# Patient Record
Sex: FEMALE | Race: WHITE | NOT HISPANIC OR LATINO | URBAN - METROPOLITAN AREA
[De-identification: names, ages, dates, MRNs, and addresses within clinical notes are randomized per-mention and may not be internally consistent; named-entity substitution may affect disease eponyms.]

---

## 2023-02-11 PROBLEM — I10 BENIGN ESSENTIAL HTN: Status: ACTIVE | Noted: 2023-02-11

## 2023-02-11 PROBLEM — I83.93 VARICOSE VEINS OF LEGS: Status: ACTIVE | Noted: 2023-02-11

## 2023-02-11 PROBLEM — M79.606 LEG PAIN: Status: ACTIVE | Noted: 2023-02-11

## 2023-02-11 PROBLEM — E66.01 MORBID OBESITY (MULTI): Status: ACTIVE | Noted: 2023-02-11

## 2023-02-11 PROBLEM — I83.899 VARICOSE VEINS WITH SWELLING: Status: ACTIVE | Noted: 2023-02-11

## 2023-02-11 PROBLEM — R03.0 ELEVATED BLOOD PRESSURE READING: Status: ACTIVE | Noted: 2023-02-11

## 2023-02-11 PROBLEM — E03.9 HYPOTHYROID: Status: ACTIVE | Noted: 2023-02-11

## 2023-02-11 RX ORDER — HYDROCHLOROTHIAZIDE 12.5 MG/1
1 CAPSULE ORAL DAILY
COMMUNITY
Start: 2021-08-17 | End: 2023-03-10 | Stop reason: SDUPTHER

## 2023-02-11 RX ORDER — AMLODIPINE BESYLATE 10 MG/1
1 TABLET ORAL DAILY
COMMUNITY
Start: 2021-11-11 | End: 2023-03-10 | Stop reason: SDUPTHER

## 2023-03-10 ENCOUNTER — OFFICE VISIT (OUTPATIENT)
Dept: PRIMARY CARE | Facility: CLINIC | Age: 43
End: 2023-03-10
Payer: COMMERCIAL

## 2023-03-10 VITALS
DIASTOLIC BLOOD PRESSURE: 82 MMHG | WEIGHT: 285 LBS | SYSTOLIC BLOOD PRESSURE: 122 MMHG | HEART RATE: 77 BPM | HEIGHT: 64 IN | BODY MASS INDEX: 48.65 KG/M2 | OXYGEN SATURATION: 98 %

## 2023-03-10 DIAGNOSIS — L98.8 FACIAL RHYTIDS: ICD-10-CM

## 2023-03-10 DIAGNOSIS — E03.9 HYPOTHYROIDISM, UNSPECIFIED TYPE: ICD-10-CM

## 2023-03-10 DIAGNOSIS — R23.4: ICD-10-CM

## 2023-03-10 DIAGNOSIS — I10 HYPERTENSION, UNSPECIFIED TYPE: Primary | ICD-10-CM

## 2023-03-10 DIAGNOSIS — Z00.00 HEALTHCARE MAINTENANCE: ICD-10-CM

## 2023-03-10 DIAGNOSIS — I10 BENIGN ESSENTIAL HTN: ICD-10-CM

## 2023-03-10 PROCEDURE — 3074F SYST BP LT 130 MM HG: CPT | Performed by: INTERNAL MEDICINE

## 2023-03-10 PROCEDURE — 1036F TOBACCO NON-USER: CPT | Performed by: INTERNAL MEDICINE

## 2023-03-10 PROCEDURE — 99214 OFFICE O/P EST MOD 30 MIN: CPT | Performed by: INTERNAL MEDICINE

## 2023-03-10 PROCEDURE — 3079F DIAST BP 80-89 MM HG: CPT | Performed by: INTERNAL MEDICINE

## 2023-03-10 RX ORDER — HYDROCHLOROTHIAZIDE 12.5 MG/1
12.5 CAPSULE ORAL EVERY MORNING
Qty: 90 CAPSULE | Refills: 1 | Status: SHIPPED | OUTPATIENT
Start: 2023-03-10 | End: 2023-08-10 | Stop reason: SDUPTHER

## 2023-03-10 RX ORDER — AMLODIPINE BESYLATE 10 MG/1
10 TABLET ORAL DAILY
Qty: 90 TABLET | Refills: 1 | Status: SHIPPED | OUTPATIENT
Start: 2023-03-10 | End: 2023-08-10 | Stop reason: SDUPTHER

## 2023-03-10 NOTE — PROGRESS NOTES
"Subjective   Patient ID: Ori Dumont is a 42 y.o. female who presents for Follow-up and Med Refill.  HPI        Patient overall feels well today blood pressures been well controlled she is exercising she mention she has noticed some recurrence of some frontal wrinkles on her forehead is interested in Botox retreatment as well as some she feels prominence of her nasolabial coming back bilateral        Health Maintenance:      Colonoscopy:      Mammogram:      Pelvic/Pap:      Low dose chest CT:      Aorta duplex:      Optho:      Podiatry:        Vaccines:      Prevnar 20:      Prevnar 13:      Pneumovax 23:      Tdap:      Shingrix:      COVID:      Influenza:        ROS:      General: denies fever/chills/weight loss      Head: denies HA/trauma/masses/dizziness      Eyes: denies vision change/loss of vision/blurry vision/diplopia/eye pain      Ears: denies hearing loss/tinnitus/otalgia/otorrhea      Nose: denies nasal drainage/anosmia      Throat: denies dysphagia/odynophagia      Lymphatics: denies lymph node swelling      Cardiac: denies CP/palpitations/orthopnea/PND      Pulmonary: denies dyspnea/cough/wheezing      GI: denies abd pain/n/v/diarrhea/melena/hematochezia/hematemesis      : denies dysuria/hematuria/change frequency      Genital: denies genital discharge/lesions      Skin: Some recurrence of frontalis  Wrinkles as well as prominence of nasolabial denies rashes/lesions/masses      MSK: denies weakness/swelling/edema/gait imbalance/pain      Neuro: denies paresthesias/seizures/dysarthria      Psych: denies depression/anxiety/suicidal or homicidal ideations            Objective   /82   Pulse 77   Ht 1.626 m (5' 4\")   Wt 129 kg (285 lb)   SpO2 98%   BMI 48.92 kg/m²      Physical Exam:     General: AO3, NAD     Head: atraumatic/NC     Eyes: EOMI/PERRLA. Negative APD     Ears: TM pearly gray, EAC clear. No lesions or erythema     Nose: symmetric nares, no discharge     Throat: trachea midline, " uvula midline pink mucosa. No thyromegaly     Lymphatics: no cervical/supraclavicular/ant or posterior cervical adenopathy/axillary/inguinal adenopathy     Breast: not examined     Chest: no deformity or tenderness to palpation     Pulm: CTA b/l, no wheeze/rhonchi/rales. nonlabored     Cardiac: RRR +s1s2, no m/r/g.      GI: soft, NT/ND. Normoactive Bsx4. No rebound/guarding.     Rectal: no examined     MSK: 5/5 strength UE LE. No edema/clubbing/cyanosis     Skin: Very mild to moderate left central frontalis righted and inferior nasolabial prominence mild at this time no rashes/lesions     Vascular: 2+ palp DP PT radials b/l. Negative carotid bruit     Neuro: CNII-XII intact. No focal deficits. Reflexes 2/4 brachioradialis bicep tricep patellar achilles. Finger to nose intact.     Psych: appropriate mood/affect                    No results found for: BMPR1A, CBCDIF      Assessment/Plan   Diagnoses and all orders for this visit:  Hypertension, unspecified type  -     hydroCHLOROthiazide (Microzide) 12.5 mg capsule; Take 1 capsule (12.5 mg) by mouth once daily in the morning.  -     amLODIPine (Norvasc) 10 mg tablet; Take 1 tablet (10 mg) by mouth once daily. For blood pressure  Healthcare maintenance  -     CBC and Auto Differential; Future  -     Basic Metabolic Panel; Future  -     Lipid panel; Future  -     T4, free; Future  -     TSH with reflex to Free T4 if abnormal; Future  -     Cortisol; Future  -     Hemoglobin A1C; Future  Hypothyroidism, unspecified type  Benign essential HTN  Facial rhytids  Comments:  As we discussed recommend Botox 40 units total at your convenience you can schedule for this  Prominent nasolabial folds  Comments:  Recommend 1 additional syringe of Juvéderm ultra XC in about 6 months for retreatment    Screening lab work due August 2023 CBC BMP T4 TSH lipid cortiso A1c    Thank you for making appointment today Ori    Please follow-up at your convenience for the Botox treatment  Please  follow-up for standard medical treatment in 6 months       Fredy Dumont, DO

## 2023-08-09 ENCOUNTER — LAB (OUTPATIENT)
Dept: LAB | Facility: LAB | Age: 43
End: 2023-08-09
Payer: COMMERCIAL

## 2023-08-09 DIAGNOSIS — Z00.00 HEALTHCARE MAINTENANCE: ICD-10-CM

## 2023-08-09 LAB
ANION GAP IN SER/PLAS: 11 MMOL/L (ref 10–20)
BASOPHILS (10*3/UL) IN BLOOD BY AUTOMATED COUNT: 0.05 X10E9/L (ref 0–0.1)
BASOPHILS/100 LEUKOCYTES IN BLOOD BY AUTOMATED COUNT: 0.8 % (ref 0–2)
CALCIUM (MG/DL) IN SER/PLAS: 9.3 MG/DL (ref 8.6–10.6)
CARBON DIOXIDE, TOTAL (MMOL/L) IN SER/PLAS: 28 MMOL/L (ref 21–32)
CHLORIDE (MMOL/L) IN SER/PLAS: 105 MMOL/L (ref 98–107)
CHOLESTEROL (MG/DL) IN SER/PLAS: 198 MG/DL (ref 0–199)
CHOLESTEROL IN HDL (MG/DL) IN SER/PLAS: 63.4 MG/DL
CHOLESTEROL/HDL RATIO: 3.1
CORTISOL (UG/DL) IN SERUM: 17.5 UG/DL (ref 2.5–20)
CREATININE (MG/DL) IN SER/PLAS: 0.69 MG/DL (ref 0.5–1.05)
EOSINOPHILS (10*3/UL) IN BLOOD BY AUTOMATED COUNT: 0.19 X10E9/L (ref 0–0.7)
EOSINOPHILS/100 LEUKOCYTES IN BLOOD BY AUTOMATED COUNT: 3.1 % (ref 0–6)
ERYTHROCYTE DISTRIBUTION WIDTH (RATIO) BY AUTOMATED COUNT: 14.4 % (ref 11.5–14.5)
ERYTHROCYTE MEAN CORPUSCULAR HEMOGLOBIN CONCENTRATION (G/DL) BY AUTOMATED: 30.3 G/DL (ref 32–36)
ERYTHROCYTE MEAN CORPUSCULAR VOLUME (FL) BY AUTOMATED COUNT: 84 FL (ref 80–100)
ERYTHROCYTES (10*6/UL) IN BLOOD BY AUTOMATED COUNT: 4.57 X10E12/L (ref 4–5.2)
ESTIMATED AVERAGE GLUCOSE FOR HBA1C: 105 MG/DL
GFR FEMALE: >90 ML/MIN/1.73M2
GLUCOSE (MG/DL) IN SER/PLAS: 92 MG/DL (ref 74–99)
HEMATOCRIT (%) IN BLOOD BY AUTOMATED COUNT: 38.3 % (ref 36–46)
HEMOGLOBIN (G/DL) IN BLOOD: 11.6 G/DL (ref 12–16)
HEMOGLOBIN A1C/HEMOGLOBIN TOTAL IN BLOOD: 5.3 %
IMMATURE GRANULOCYTES/100 LEUKOCYTES IN BLOOD BY AUTOMATED COUNT: 0.2 % (ref 0–0.9)
LDL: 119 MG/DL (ref 0–99)
LEUKOCYTES (10*3/UL) IN BLOOD BY AUTOMATED COUNT: 6.1 X10E9/L (ref 4.4–11.3)
LYMPHOCYTES (10*3/UL) IN BLOOD BY AUTOMATED COUNT: 1.96 X10E9/L (ref 1.2–4.8)
LYMPHOCYTES/100 LEUKOCYTES IN BLOOD BY AUTOMATED COUNT: 32 % (ref 13–44)
MONOCYTES (10*3/UL) IN BLOOD BY AUTOMATED COUNT: 0.53 X10E9/L (ref 0.1–1)
MONOCYTES/100 LEUKOCYTES IN BLOOD BY AUTOMATED COUNT: 8.7 % (ref 2–10)
NEUTROPHILS (10*3/UL) IN BLOOD BY AUTOMATED COUNT: 3.38 X10E9/L (ref 1.2–7.7)
NEUTROPHILS/100 LEUKOCYTES IN BLOOD BY AUTOMATED COUNT: 55.2 % (ref 40–80)
NRBC (PER 100 WBCS) BY AUTOMATED COUNT: 0 /100 WBC (ref 0–0)
PLATELETS (10*3/UL) IN BLOOD AUTOMATED COUNT: 473 X10E9/L (ref 150–450)
POTASSIUM (MMOL/L) IN SER/PLAS: 3.9 MMOL/L (ref 3.5–5.3)
SODIUM (MMOL/L) IN SER/PLAS: 140 MMOL/L (ref 136–145)
THYROTROPIN (MIU/L) IN SER/PLAS BY DETECTION LIMIT <= 0.05 MIU/L: 2.72 MIU/L (ref 0.44–3.98)
THYROXINE (T4) FREE (NG/DL) IN SER/PLAS: 0.97 NG/DL (ref 0.78–1.48)
TRIGLYCERIDE (MG/DL) IN SER/PLAS: 77 MG/DL (ref 0–149)
UREA NITROGEN (MG/DL) IN SER/PLAS: 14 MG/DL (ref 6–23)
VLDL: 15 MG/DL (ref 0–40)

## 2023-08-09 PROCEDURE — 36415 COLL VENOUS BLD VENIPUNCTURE: CPT

## 2023-08-09 PROCEDURE — 85025 COMPLETE CBC W/AUTO DIFF WBC: CPT

## 2023-08-09 PROCEDURE — 80061 LIPID PANEL: CPT

## 2023-08-09 PROCEDURE — 80048 BASIC METABOLIC PNL TOTAL CA: CPT

## 2023-08-09 PROCEDURE — 84443 ASSAY THYROID STIM HORMONE: CPT

## 2023-08-09 PROCEDURE — 83036 HEMOGLOBIN GLYCOSYLATED A1C: CPT

## 2023-08-09 PROCEDURE — 82533 TOTAL CORTISOL: CPT

## 2023-08-09 PROCEDURE — 84439 ASSAY OF FREE THYROXINE: CPT

## 2023-08-10 ENCOUNTER — OFFICE VISIT (OUTPATIENT)
Dept: PRIMARY CARE | Facility: CLINIC | Age: 43
End: 2023-08-10
Payer: COMMERCIAL

## 2023-08-10 VITALS
SYSTOLIC BLOOD PRESSURE: 130 MMHG | DIASTOLIC BLOOD PRESSURE: 88 MMHG | HEIGHT: 64 IN | BODY MASS INDEX: 48.65 KG/M2 | HEART RATE: 73 BPM | WEIGHT: 285 LBS | OXYGEN SATURATION: 95 %

## 2023-08-10 DIAGNOSIS — D75.839 THROMBOCYTOSIS: ICD-10-CM

## 2023-08-10 DIAGNOSIS — D64.9 ANEMIA, UNSPECIFIED TYPE: Primary | ICD-10-CM

## 2023-08-10 DIAGNOSIS — I10 HYPERTENSION, UNSPECIFIED TYPE: ICD-10-CM

## 2023-08-10 PROCEDURE — 3075F SYST BP GE 130 - 139MM HG: CPT | Performed by: INTERNAL MEDICINE

## 2023-08-10 PROCEDURE — 99214 OFFICE O/P EST MOD 30 MIN: CPT | Performed by: INTERNAL MEDICINE

## 2023-08-10 PROCEDURE — 1036F TOBACCO NON-USER: CPT | Performed by: INTERNAL MEDICINE

## 2023-08-10 PROCEDURE — 3079F DIAST BP 80-89 MM HG: CPT | Performed by: INTERNAL MEDICINE

## 2023-08-10 RX ORDER — AMLODIPINE BESYLATE 10 MG/1
10 TABLET ORAL DAILY
Qty: 90 TABLET | Refills: 1 | Status: SHIPPED | OUTPATIENT
Start: 2023-08-10 | End: 2024-03-07 | Stop reason: SDUPTHER

## 2023-08-10 RX ORDER — HYDROCHLOROTHIAZIDE 12.5 MG/1
12.5 CAPSULE ORAL EVERY MORNING
Qty: 90 CAPSULE | Refills: 1 | Status: SHIPPED | OUTPATIENT
Start: 2023-08-10 | End: 2024-03-07 | Stop reason: SDUPTHER

## 2023-08-10 NOTE — PROGRESS NOTES
"Subjective   Patient ID: Ori Dumont is a 43 y.o. female who presents for Follow-up and Results (Lab results).  HPI        Patient presents for follow-up occasionally heartburn acid reflux in the back of the throat over the last several weeks intermittently none at present grade 0 out of 10 seems worse when she has citrus drinks or tomatoes better with Tums or avoidance seems worse with some stress with the job search  Occasionally her stomach feels upset when it happens none at present  Denies nausea vomiting fever chills hematochezia melena hematemesis        Health Maintenance:      Colonoscopy:      Mammogram:      Pelvic/Pap:      Low dose chest CT:      Aorta duplex:      Optho:      Podiatry:        Vaccines:      Prevnar 20:      Prevnar 13:      Pneumovax 23:      Tdap:      Shingrix:      COVID:      Influenza:        ROS:      General: denies fever/chills/weight loss      Head: denies HA/trauma/masses/dizziness      Eyes: denies vision change/loss of vision/blurry vision/diplopia/eye pain      Ears: denies hearing loss/tinnitus/otalgia/otorrhea      Nose: denies nasal drainage/anosmia      Throat: denies dysphagia/odynophagia      Lymphatics: denies lymph node swelling      Cardiac: denies CP/palpitations/orthopnea/PND      Pulmonary: denies dyspnea/cough/wheezing      GI: Intermittent acidic reflux in the back of the throat denies abd pain/n/v/diarrhea/melena/hematochezia/hematemesis      : denies dysuria/hematuria/change frequency      Genital: denies genital discharge/lesions      Skin: denies rashes/lesions/masses      MSK: denies weakness/swelling/edema/gait imbalance/pain      Neuro: denies paresthesias/seizures/dysarthria      Psych: Occasionally feels bored not enjoying her workouts some stress with the job search denies depression/anxiety/suicidal or homicidal ideations            Objective   /88   Pulse 73   Ht 1.626 m (5' 4\")   Wt 129 kg (285 lb)   SpO2 95%   BMI 48.92 kg/m²     " " Physical Exam:     General: AO3, NAD     Head: atraumatic/NC     Eyes: EOMI/PERRLA. Negative APD     Ears: TM pearly gray, EAC clear. No lesions or erythema     Nose: symmetric nares, no discharge     Throat: trachea midline, uvula midline pink mucosa. No thyromegaly     Lymphatics: no cervical/supraclavicular/ant or posterior cervical adenopathy/axillary/inguinal adenopathy     Breast: not examined     Chest: no deformity or tenderness to palpation     Pulm: CTA b/l, no wheeze/rhonchi/rales. nonlabored     Cardiac: RRR +s1s2, no m/r/g.      GI: soft, NT/ND. Normoactive Bsx4. No rebound/guarding.     Rectal: no examined     MSK: 5/5 strength UE LE. No edema/clubbing/cyanosis     Skin: no rashes/lesions     Vascular: 2+ palp DP PT radials b/l. Negative carotid bruit     Neuro: CNII-XII intact. No focal deficits. Reflexes 2/4 brachioradialis bicep tricep patellar achilles. Finger to nose intact.     Psych: appropriate mood/affect                    No results found for: \"BMPR1A\", \"CBCDIF\"      Assessment/Plan   Diagnoses and all orders for this visit:  Anemia, unspecified type  -     Iron and TIBC; Future  -     Ferritin; Future  Hypertension, unspecified type  -     hydroCHLOROthiazide (Microzide) 12.5 mg capsule; Take 1 capsule (12.5 mg) by mouth once daily in the morning.  -     amLODIPine (Norvasc) 10 mg tablet; Take 1 tablet (10 mg) by mouth once daily. For blood pressure  Thrombocytosis  -     CBC and Auto Differential; Future       Avoid citrus type foods alcohol milk chocolate tomatoes monitor if does not get better will then need trial of PPI and may be GI referral    Try to  a new hobby like a new workout get back into martial arts    Screening blood work due July 2024    Thank you for making appointment today Ori    Please follow-up 3 months    Fredy Dumont,   "

## 2024-03-07 ENCOUNTER — OFFICE VISIT (OUTPATIENT)
Dept: PRIMARY CARE | Facility: CLINIC | Age: 44
End: 2024-03-07
Payer: COMMERCIAL

## 2024-03-07 VITALS
OXYGEN SATURATION: 98 % | HEART RATE: 74 BPM | SYSTOLIC BLOOD PRESSURE: 140 MMHG | BODY MASS INDEX: 48.65 KG/M2 | DIASTOLIC BLOOD PRESSURE: 90 MMHG | WEIGHT: 285 LBS | HEIGHT: 64 IN

## 2024-03-07 DIAGNOSIS — I10 HYPERTENSION, UNSPECIFIED TYPE: ICD-10-CM

## 2024-03-07 DIAGNOSIS — Z12.31 VISIT FOR SCREENING MAMMOGRAM: Primary | ICD-10-CM

## 2024-03-07 DIAGNOSIS — L98.8 FACIAL RHYTIDS: ICD-10-CM

## 2024-03-07 DIAGNOSIS — Z23 NEED FOR INFLUENZA VACCINATION: ICD-10-CM

## 2024-03-07 PROBLEM — L30.9 DERMATITIS, UNSPECIFIED: Status: RESOLVED | Noted: 2020-02-06 | Resolved: 2024-03-07

## 2024-03-07 PROBLEM — D49.2 NEOPLASM OF UNSPECIFIED BEHAVIOR OF BONE, SOFT TISSUE, AND SKIN: Status: RESOLVED | Noted: 2020-02-06 | Resolved: 2024-03-07

## 2024-03-07 PROCEDURE — 90471 IMMUNIZATION ADMIN: CPT | Performed by: INTERNAL MEDICINE

## 2024-03-07 PROCEDURE — 3080F DIAST BP >= 90 MM HG: CPT | Performed by: INTERNAL MEDICINE

## 2024-03-07 PROCEDURE — 90686 IIV4 VACC NO PRSV 0.5 ML IM: CPT | Performed by: INTERNAL MEDICINE

## 2024-03-07 PROCEDURE — 3077F SYST BP >= 140 MM HG: CPT | Performed by: INTERNAL MEDICINE

## 2024-03-07 PROCEDURE — 99213 OFFICE O/P EST LOW 20 MIN: CPT | Performed by: INTERNAL MEDICINE

## 2024-03-07 PROCEDURE — 1036F TOBACCO NON-USER: CPT | Performed by: INTERNAL MEDICINE

## 2024-03-07 RX ORDER — HYDROCHLOROTHIAZIDE 12.5 MG/1
12.5 CAPSULE ORAL EVERY MORNING
Qty: 90 CAPSULE | Refills: 1 | Status: SHIPPED | OUTPATIENT
Start: 2024-03-07

## 2024-03-07 RX ORDER — AMLODIPINE BESYLATE 10 MG/1
10 TABLET ORAL DAILY
Qty: 90 TABLET | Refills: 1 | Status: SHIPPED | OUTPATIENT
Start: 2024-03-07

## 2024-03-07 ASSESSMENT — PATIENT HEALTH QUESTIONNAIRE - PHQ9
2. FEELING DOWN, DEPRESSED OR HOPELESS: NOT AT ALL
SUM OF ALL RESPONSES TO PHQ9 QUESTIONS 1 AND 2: 0
1. LITTLE INTEREST OR PLEASURE IN DOING THINGS: NOT AT ALL

## 2024-03-07 NOTE — PROGRESS NOTES
"Subjective   Patient ID: Ori Dumont is a 43 y.o. female who presents for Follow-up, Med Refill, and Flu Vaccine.  HPI        past medical history hypothyroidism right lower extremity venous reflux     Patient presents for follow-up overall doing well she does states she has been out of her blood pressure medicines for a week and a half or so so that has not taken them I advised her in the future please call and she can get an emergency supply if she is overdoing runs out I do not want her out of her medicines she should always take them as ordered        Health Maintenance:      Colonoscopy:      Mammogram:      Pelvic/Pap:      Low dose chest CT:      Aorta duplex:      Optho:      Podiatry:        Vaccines:      Refer to Epic Vaccination Log        ROS:      General: denies fever/chills/weight loss      Head: denies HA/trauma/masses/dizziness      Eyes: denies vision change/loss of vision/blurry vision/diplopia/eye pain      Ears: denies hearing loss/tinnitus/otalgia/otorrhea      Nose: denies nasal drainage/anosmia      Throat: denies dysphagia/odynophagia      Lymphatics: denies lymph node swelling      Breast: denies masses/discharge/dimpling/skin changes      Cardiac: denies CP/palpitations/orthopnea/PND      Pulmonary: denies dyspnea/cough/wheezing      GI: denies abd pain/n/v/diarrhea/melena/hematochezia/hematemesis      : denies dysuria/hematuria/change frequency      Genital: denies genital discharge/lesions      Skin: Some recurrence of forehead lines frontalis muscle and crows feet months of Botox touchup denies rashes/lesions/masses      MSK: denies weakness/swelling/edema/gait imbalance/pain      Neuro: denies paresthesias/seizures/dysarthria      Psych: denies depression/anxiety/suicidal or homicidal ideations            Objective   /90   Pulse 74   Ht 1.626 m (5' 4\")   Wt 129 kg (285 lb)   SpO2 98%   BMI 48.92 kg/m²      Physical Exam:     General: AO3, NAD     Head: atraumatic/NC    " " Eyes: EOMI/PERRLA. Negative APD     Ears: TM pearly gray, EAC clear. No lesions or erythema     Nose: symmetric nares, no discharge     Throat: trachea midline, uvula midline pink mucosa. No thyromegaly     Lymphatics: no cervical/supraclavicular/ant or posterior cervical adenopathy/axillary/inguinal adenopathy     Breast: not examined     Chest: no deformity or tenderness to palpation     Pulm: CTA b/l, no wheeze/rhonchi/rales. nonlabored     Cardiac: RRR +s1s2, no m/r/g.      GI: soft, NT/ND. Normoactive Bsx4. No rebound/guarding.     Rectal: not examined     Genital: not examined     MSK: 5/5 strength UE LE. No edema/clubbing/cyanosis     Skin: Mild frontalis rhytids and lateral canthus rhytids mild no rashes/lesions     Vascular: 2+ palp DP PT radials b/l. Negative carotid bruit     Neuro: CNII-XII intact. No focal deficits. Reflexes 2/4 brachioradialis bicep tricep patellar achilles. Finger to nose intact.     Psych: appropriate mood/affect                    No results found for: \"BMPR1A\", \"CBCDIF\"      Assessment/Plan   Diagnoses and all orders for this visit:  Visit for screening mammogram  -     BI mammo bilateral screening tomosynthesis; Future  Hypertension, unspecified type  -     amLODIPine (Norvasc) 10 mg tablet; Take 1 tablet (10 mg) by mouth once daily. For blood pressure  -     hydroCHLOROthiazide (Microzide) 12.5 mg capsule; Take 1 capsule (12.5 mg) by mouth once daily in the morning.  Need for influenza vaccination  -     Flu vaccine (IIV4) age 6 months and greater, preservative free  Facial rhytids    Recommend taking blood pressure medicines as ordered goal blood pressure less than 140/90    As we discussed please schedule Botox follow-up appointment recommend 52 units total-20 units glabella area 20 unit frontalis 6 units bilateral lateral canthus you can schedule on a Friday around 12 PM at your convenience    Screening blood work due August thousand 24    Thank you for making appointment " today Ori Fisher to schedule Botox appt at your convenience  Please follow-up in 6 months for standard medical appointment as well     Fredy Dumont DO, NORBERTO Soler MA

## 2024-03-15 ENCOUNTER — APPOINTMENT (OUTPATIENT)
Dept: PRIMARY CARE | Facility: CLINIC | Age: 44
End: 2024-03-15
Payer: COMMERCIAL

## 2024-03-29 ENCOUNTER — OFFICE VISIT (OUTPATIENT)
Dept: PRIMARY CARE | Facility: CLINIC | Age: 44
End: 2024-03-29

## 2024-03-29 DIAGNOSIS — L98.8 FACIAL RHYTIDS: Primary | ICD-10-CM

## 2024-03-29 PROCEDURE — BOTOX BOTOX 1 UNIT: Performed by: INTERNAL MEDICINE

## 2024-03-29 NOTE — PROGRESS NOTES
Subjective   Patient ID: Ori Dumont is a 43 y.o. female who presents for Botulinum Toxin Injection.  HPI  41yF past medical history hypothyroidism presents today for Botox treatment     Her main concern are some forehead lines wrinkles mild recurrent results lasted 3 years from last time treated she is very happy with the result along with some crows feet lateral canthal lines     Had successful results in the past in 2019 with Botox and dermal filler treatment as well as in 2021     Denies any history of anaphylaxis allergies bleeding disorders clotting blood thinners     Time taken to review procedure mechanism of action consents obtained for both Botox      she is here for follow up today--all procedure plans reviewed with patient--            Health Maintenance:      Colonoscopy:      Mammogram:      Pelvic/Pap:      Low dose chest CT:      Aorta duplex:      Optho:      Podiatry:        Vaccines:      Refer to Epic Vaccination Log        ROS:      General: denies fever/chills/weight loss      Head: denies HA/trauma/masses/dizziness      Eyes: denies vision change/loss of vision/blurry vision/diplopia/eye pain      Ears: denies hearing loss/tinnitus/otalgia/otorrhea      Nose: denies nasal drainage/anosmia      Throat: denies dysphagia/odynophagia      Lymphatics: denies lymph node swelling      Breast: denies masses/discharge/dimpling/skin changes      Cardiac: denies CP/palpitations/orthopnea/PND      Pulmonary: denies dyspnea/cough/wheezing      GI: denies abd pain/n/v/diarrhea/melena/hematochezia/hematemesis      : denies dysuria/hematuria/change frequency      Genital: denies genital discharge/lesions      Skin: denies rashes/lesions/masses      MSK: denies weakness/swelling/edema/gait imbalance/pain      Neuro: denies paresthesias/seizures/dysarthria      Psych: denies depression/anxiety/suicidal or homicidal ideations            Objective   There were no vitals taken for this visit.     Physical  "Exam:     General: AO3, NAD     Head: atraumatic/NC     Eyes: EOMI/PERRLA. Negative APD     Ears: TM pearly gray, EAC clear. No lesions or erythema     Nose: symmetric nares, no discharge     Throat: trachea midline, uvula midline pink mucosa. No thyromegaly     Lymphatics: no cervical/supraclavicular/ant or posterior cervical adenopathy/axillary/inguinal adenopathy     Breast: not examined     Chest: no deformity or tenderness to palpation     Pulm: CTA b/l, no wheeze/rhonchi/rales. nonlabored     Cardiac: RRR +s1s2, no m/r/g.      GI: soft, NT/ND. Normoactive Bsx4. No rebound/guarding.     Rectal: not examined     Genital: not examined     MSK: 5/5 strength UE LE. No edema/clubbing/cyanosis     Skin: Mild frontalis rhytids  no rashes/lesions     Vascular: 2+ palp DP PT radials b/l. Negative carotid bruit     Neuro: CNII-XII intact. No focal deficits. Reflexes 2/4 brachioradialis bicep tricep patellar achilles. Finger to nose intact.     Psych: appropriate mood/affect                    No results found for: \"BMPR1A\", \"CBCDIF\"  injection note     consents reviewed  Botox   Before photos taken  alcohol prep used for face  ice applied to numb for anesthesia topically prior to injection, and for hemostasis  white skin pencil marked  50 units botox recon with 1.25mL 0.9% ns, additional 2 units drawn from already premixed vial Botox from same lot [id number]  1mL syringe with 32 G needle   20 untis glabellar region, stayiing 1cm above orb rim for  treatment  20 units frontalis  6 units each lateral canthal side 1.5cm lateral to lat canthi  no complications bleeding or bruising -patient tolerated well             Both procedures mechanisms reviewed all questions answered  Time taken to complete consent form reviewed procedure    Charges submitted in accordance with [place] who advised to use 58269 code, and Ally advised with medical billing  to enter Botox 1 unit with the # under charge " capture--  Assessment/Plan   Diagnoses and all orders for this visit:  Facial rhytids    You did very well with the Botox injection today Ori!  keep areas clean, avoid makeup for 48 hours  retinol lotion at night, SPF 30 or above in the sun--avoid sun for 72 hours or extreme heat  exervise your facial muscles but do not massage  avoid exercise for 24 hours  please sit up right for 4 hours  avoid laying on your face at night  may use cool compress at needed 15 min on at a time for brusing or pain  can use tylenol 325mg every 8 hours for pain   may use motrin 200mg daily as needed for the next week for pain/swelling as well    You for making appointment today Ori    You may complete repeat Botox injections every 3 months as desired to maintain results    Please follow-up in 2 weeks for post injection pictures and follow-up       Fredy Dumont DO, NORBERTO Soler MA

## 2024-04-18 ENCOUNTER — HOSPITAL ENCOUNTER (OUTPATIENT)
Dept: RADIOLOGY | Facility: CLINIC | Age: 44
Discharge: HOME | End: 2024-04-18
Payer: COMMERCIAL

## 2024-04-18 VITALS — WEIGHT: 284.39 LBS | HEIGHT: 64 IN | BODY MASS INDEX: 48.55 KG/M2

## 2024-04-18 DIAGNOSIS — Z12.31 VISIT FOR SCREENING MAMMOGRAM: ICD-10-CM

## 2024-04-18 PROCEDURE — 77067 SCR MAMMO BI INCL CAD: CPT

## 2024-04-18 PROCEDURE — 77067 SCR MAMMO BI INCL CAD: CPT | Performed by: RADIOLOGY

## 2024-04-18 PROCEDURE — 77063 BREAST TOMOSYNTHESIS BI: CPT | Performed by: RADIOLOGY

## 2024-05-02 DIAGNOSIS — N64.89 BREAST ASYMMETRY: Primary | ICD-10-CM

## 2024-06-13 ENCOUNTER — HOSPITAL ENCOUNTER (OUTPATIENT)
Dept: RADIOLOGY | Facility: HOSPITAL | Age: 44
Discharge: HOME | End: 2024-06-13
Payer: COMMERCIAL

## 2024-06-13 ENCOUNTER — APPOINTMENT (OUTPATIENT)
Dept: PRIMARY CARE | Facility: CLINIC | Age: 44
End: 2024-06-13
Payer: COMMERCIAL

## 2024-06-13 ENCOUNTER — TELEPHONE (OUTPATIENT)
Dept: PRIMARY CARE | Facility: CLINIC | Age: 44
End: 2024-06-13

## 2024-06-13 VITALS — BODY MASS INDEX: 47.8 KG/M2 | WEIGHT: 280 LBS | HEIGHT: 64 IN

## 2024-06-13 DIAGNOSIS — N64.89 BREAST ASYMMETRY: ICD-10-CM

## 2024-06-13 PROCEDURE — 77065 DX MAMMO INCL CAD UNI: CPT | Mod: RT

## 2024-06-13 PROCEDURE — 77065 DX MAMMO INCL CAD UNI: CPT | Mod: RIGHT SIDE | Performed by: RADIOLOGY

## 2024-06-13 PROCEDURE — 77061 BREAST TOMOSYNTHESIS UNI: CPT | Mod: RIGHT SIDE | Performed by: RADIOLOGY

## 2024-07-22 ENCOUNTER — APPOINTMENT (OUTPATIENT)
Dept: DERMATOLOGY | Facility: CLINIC | Age: 44
End: 2024-07-22
Payer: COMMERCIAL

## 2024-08-13 ENCOUNTER — APPOINTMENT (OUTPATIENT)
Dept: PRIMARY CARE | Facility: CLINIC | Age: 44
End: 2024-08-13
Payer: COMMERCIAL

## 2024-08-20 DIAGNOSIS — I10 HYPERTENSION, UNSPECIFIED TYPE: ICD-10-CM

## 2024-08-20 RX ORDER — AMLODIPINE BESYLATE 10 MG/1
10 TABLET ORAL DAILY
Qty: 10 TABLET | Refills: 0 | Status: SHIPPED | OUTPATIENT
Start: 2024-08-20

## 2024-08-20 RX ORDER — HYDROCHLOROTHIAZIDE 12.5 MG/1
12.5 CAPSULE ORAL EVERY MORNING
Qty: 10 CAPSULE | Refills: 0 | Status: SHIPPED | OUTPATIENT
Start: 2024-08-20

## 2024-09-04 DIAGNOSIS — Z00.00 HEALTHCARE MAINTENANCE: Primary | ICD-10-CM

## 2024-09-12 ENCOUNTER — LAB (OUTPATIENT)
Dept: LAB | Facility: LAB | Age: 44
End: 2024-09-12
Payer: COMMERCIAL

## 2024-09-12 DIAGNOSIS — Z00.00 HEALTHCARE MAINTENANCE: ICD-10-CM

## 2024-09-12 LAB
ALBUMIN SERPL BCP-MCNC: 4.2 G/DL (ref 3.4–5)
ALP SERPL-CCNC: 67 U/L (ref 33–110)
ALT SERPL W P-5'-P-CCNC: 13 U/L (ref 7–45)
ANION GAP SERPL CALC-SCNC: 11 MMOL/L (ref 10–20)
AST SERPL W P-5'-P-CCNC: 14 U/L (ref 9–39)
BASOPHILS # BLD AUTO: 0.07 X10*3/UL (ref 0–0.1)
BASOPHILS NFR BLD AUTO: 1.3 %
BILIRUB SERPL-MCNC: 0.5 MG/DL (ref 0–1.2)
BUN SERPL-MCNC: 20 MG/DL (ref 6–23)
CALCIUM SERPL-MCNC: 9.3 MG/DL (ref 8.6–10.6)
CHLORIDE SERPL-SCNC: 102 MMOL/L (ref 98–107)
CHOLEST SERPL-MCNC: 213 MG/DL (ref 0–199)
CHOLESTEROL/HDL RATIO: 3.3
CO2 SERPL-SCNC: 30 MMOL/L (ref 21–32)
CREAT SERPL-MCNC: 0.81 MG/DL (ref 0.5–1.05)
EGFRCR SERPLBLD CKD-EPI 2021: >90 ML/MIN/1.73M*2
EOSINOPHIL # BLD AUTO: 0.18 X10*3/UL (ref 0–0.7)
EOSINOPHIL NFR BLD AUTO: 3.2 %
ERYTHROCYTE [DISTWIDTH] IN BLOOD BY AUTOMATED COUNT: 13.1 % (ref 11.5–14.5)
EST. AVERAGE GLUCOSE BLD GHB EST-MCNC: 100 MG/DL
GLUCOSE SERPL-MCNC: 97 MG/DL (ref 74–99)
HBA1C MFR BLD: 5.1 %
HCT VFR BLD AUTO: 38.2 % (ref 36–46)
HDLC SERPL-MCNC: 65 MG/DL
HGB BLD-MCNC: 11.9 G/DL (ref 12–16)
IMM GRANULOCYTES # BLD AUTO: 0.01 X10*3/UL (ref 0–0.7)
IMM GRANULOCYTES NFR BLD AUTO: 0.2 % (ref 0–0.9)
LDLC SERPL CALC-MCNC: 132 MG/DL
LYMPHOCYTES # BLD AUTO: 1.96 X10*3/UL (ref 1.2–4.8)
LYMPHOCYTES NFR BLD AUTO: 35.1 %
MCH RBC QN AUTO: 26 PG (ref 26–34)
MCHC RBC AUTO-ENTMCNC: 31.2 G/DL (ref 32–36)
MCV RBC AUTO: 83 FL (ref 80–100)
MONOCYTES # BLD AUTO: 0.53 X10*3/UL (ref 0.1–1)
MONOCYTES NFR BLD AUTO: 9.5 %
NEUTROPHILS # BLD AUTO: 2.83 X10*3/UL (ref 1.2–7.7)
NEUTROPHILS NFR BLD AUTO: 50.7 %
NON HDL CHOLESTEROL: 148 MG/DL (ref 0–149)
NRBC BLD-RTO: 0 /100 WBCS (ref 0–0)
PLATELET # BLD AUTO: 488 X10*3/UL (ref 150–450)
POTASSIUM SERPL-SCNC: 4.1 MMOL/L (ref 3.5–5.3)
PROT SERPL-MCNC: 7.5 G/DL (ref 6.4–8.2)
RBC # BLD AUTO: 4.58 X10*6/UL (ref 4–5.2)
SODIUM SERPL-SCNC: 139 MMOL/L (ref 136–145)
T4 FREE SERPL-MCNC: 1.15 NG/DL (ref 0.78–1.48)
TRIGL SERPL-MCNC: 81 MG/DL (ref 0–149)
TSH SERPL-ACNC: 2.91 MIU/L (ref 0.44–3.98)
VLDL: 16 MG/DL (ref 0–40)
WBC # BLD AUTO: 5.6 X10*3/UL (ref 4.4–11.3)

## 2024-09-12 PROCEDURE — 84443 ASSAY THYROID STIM HORMONE: CPT

## 2024-09-12 PROCEDURE — 85025 COMPLETE CBC W/AUTO DIFF WBC: CPT

## 2024-09-12 PROCEDURE — 36415 COLL VENOUS BLD VENIPUNCTURE: CPT

## 2024-09-12 PROCEDURE — 84439 ASSAY OF FREE THYROXINE: CPT

## 2024-09-12 PROCEDURE — 80061 LIPID PANEL: CPT

## 2024-09-12 PROCEDURE — 83036 HEMOGLOBIN GLYCOSYLATED A1C: CPT

## 2024-09-12 PROCEDURE — 80053 COMPREHEN METABOLIC PANEL: CPT

## 2024-09-13 ENCOUNTER — APPOINTMENT (OUTPATIENT)
Dept: PRIMARY CARE | Facility: CLINIC | Age: 44
End: 2024-09-13
Payer: COMMERCIAL

## 2024-09-13 VITALS — SYSTOLIC BLOOD PRESSURE: 126 MMHG | DIASTOLIC BLOOD PRESSURE: 76 MMHG

## 2024-09-13 DIAGNOSIS — D64.9 ANEMIA, UNSPECIFIED TYPE: Primary | ICD-10-CM

## 2024-09-13 DIAGNOSIS — Z00.00 GENERAL MEDICAL EXAM: ICD-10-CM

## 2024-09-13 DIAGNOSIS — E66.9 OBESITY, UNSPECIFIED CLASSIFICATION, UNSPECIFIED OBESITY TYPE, UNSPECIFIED WHETHER SERIOUS COMORBIDITY PRESENT: ICD-10-CM

## 2024-09-13 DIAGNOSIS — I10 HYPERTENSION, UNSPECIFIED TYPE: ICD-10-CM

## 2024-09-13 DIAGNOSIS — D75.839 THROMBOCYTOSIS: ICD-10-CM

## 2024-09-13 RX ORDER — AMLODIPINE BESYLATE 10 MG/1
10 TABLET ORAL DAILY
Qty: 90 TABLET | Refills: 1 | Status: SHIPPED | OUTPATIENT
Start: 2024-09-13

## 2024-09-13 RX ORDER — HYDROCHLOROTHIAZIDE 12.5 MG/1
12.5 CAPSULE ORAL EVERY MORNING
Qty: 90 CAPSULE | Refills: 1 | Status: SHIPPED | OUTPATIENT
Start: 2024-09-13

## 2024-09-13 NOTE — PROGRESS NOTES
Subjective   Patient ID: Ori Dumont is a 44 y.o. female who presents for Annual Exam (Med refill).  HPI        past medical history hypothyroidism right lower extremity venous reflux     Overall feeling well here for a physical maybe some fatigue for several months grade 2 out of 10 nothing makes better or worse  She does note decrease in her menstrual cycle though when she gets them there may be more heavy  She is otherwise working out 4 times a week at the gym weight lifting  Denies fever chills night sweats unexplained weight loss        Health Maintenance:      Colonoscopy:      Mammogram: 2024      Pelvic/Pap:      Low dose chest CT:      Aorta duplex:      Optho:      Podiatry:        Vaccines:      Refer to Epic Vaccination Log        ROS:      General: Fatigue denies fever/chills/weight loss      Head: denies HA/trauma/masses/dizziness      Eyes: denies vision change/loss of vision/blurry vision/diplopia/eye pain      Ears: denies hearing loss/tinnitus/otalgia/otorrhea      Nose: denies nasal drainage/anosmia      Throat: denies dysphagia/odynophagia      Lymphatics: denies lymph node swelling      Breast: denies masses/discharge/dimpling/skin changes      Cardiac: denies CP/palpitations/orthopnea/PND      Pulmonary: denies dyspnea/cough/wheezing      GI: denies abd pain/n/v/diarrhea/melena/hematochezia/hematemesis      : denies dysuria/hematuria/change frequency      Genital: denies genital discharge/lesions      Skin:  denies rashes/lesions/masses      MSK: denies weakness/swelling/edema/gait imbalance/pain      Neuro: denies paresthesias/seizures/dysarthria      Psych: denies depression/anxiety/suicidal or homicidal ideations            Objective   /76      Physical Exam:     General: AO3, NAD     Head: atraumatic/NC     Eyes: EOMI/PERRLA. Negative APD     Ears: TM pearly gray, EAC clear. No lesions or erythema     Nose: symmetric nares, no discharge     Throat: trachea midline, uvula midline  "pink mucosa. No thyromegaly     Lymphatics: no cervical/supraclavicular/ant or posterior cervical adenopathy/axillary/inguinal adenopathy     Breast: not examined     Chest: no deformity or tenderness to palpation     Pulm: CTA b/l, no wheeze/rhonchi/rales. nonlabored     Cardiac: RRR +s1s2, no m/r/g.      GI: soft, NT/ND. Normoactive Bsx4. No rebound/guarding.     Rectal: not examined     Genital: not examined     MSK: 5/5 strength UE LE. No edema/clubbing/cyanosis     Skin:  no rashes/lesions     Vascular: 2+ palp DP PT radials b/l. Negative carotid bruit     Neuro: CNII-XII intact. No focal deficits. Reflexes 2/4 brachioradialis bicep tricep patellar achilles. Finger to nose intact.     Psych: appropriate mood/affect                    No results found for: \"BMPR1A\", \"CBCDIF\"  Patient notified by Epic messenger after visit, apologized for inconvenience of not receiving her flu vaccine advised she can return to office for nurse visit to receive or can go to local pharmacy    Bonny Motta   Doctor, I believe the pt above left without getting her flu vaccine. Tonia and I did not give it. Please cancel the vaccine. Thanks    9 mins  JG  ok yes I cancelled it since she didn't get it , welcome, now that I have your name I will be able to message you when flu vaccines are needed thank you   Assessment/Plan   Diagnoses and all orders for this visit:  Anemia, unspecified type  Comments:  Possible iron deficiency less likely other  Orders:  -     Iron and TIBC; Future  -     Ferritin; Future  -     BRIANNA with Reflex to YESSY; Future  -     Reticulocytes; Future  -     Haptoglobin; Future  -     Referral to Hematology and Oncology; Future  Hypertension, unspecified type  -     amLODIPine (Norvasc) 10 mg tablet; Take 1 tablet (10 mg) by mouth once daily. For blood pressure  -     hydroCHLOROthiazide (Microzide) 12.5 mg capsule; Take 1 capsule (12.5 mg) by mouth once daily in the " morning.  Thrombocytosis  Comments:  Inflammatory rule out myeloproliferative disorder versus other no signs of infection or inflammation otherwise versus autoimmune  Orders:  -     Referral to Hematology and Oncology; Future  Obesity, unspecified classification, unspecified obesity type, unspecified whether serious comorbidity present  -     semaglutide, weight loss, (WEGOVY) 0.25 mg/0.5 mL pen injector; Inject 0.25 mg under the skin every 7 days.  General medical exam    Recommend taking blood pressure medicines as ordered goal blood pressure less than 140/90    Recheck lipid panel in 6 months  Screening blood work due September 2025    Thank you for making appointment today Ori    Please follow-up in 6 months      Fredy Dumont DO, NORBERTO Dumont DO

## 2024-09-16 ENCOUNTER — DOCUMENTATION (OUTPATIENT)
Dept: PRIMARY CARE | Facility: CLINIC | Age: 44
End: 2024-09-16

## 2024-09-16 DIAGNOSIS — E66.9 OBESITY, UNSPECIFIED CLASSIFICATION, UNSPECIFIED OBESITY TYPE, UNSPECIFIED WHETHER SERIOUS COMORBIDITY PRESENT: Primary | ICD-10-CM

## 2024-09-16 NOTE — PROGRESS NOTES
Thank you for administering the flu vaccine, will do - please keep your epic messenger on and I will message you when flu vaccines are needed on my patients that you check in    Tonia Simmons MA   to Me       9/16/24  8:54 AM  We don't look at orders , we are busy with other patient care, the other physicians in this practice, either come find one of us or put a note at our desk, I did give vaccine , please reenter order and bill

## 2024-10-10 ENCOUNTER — LAB (OUTPATIENT)
Dept: LAB | Facility: LAB | Age: 44
End: 2024-10-10
Payer: COMMERCIAL

## 2024-10-10 DIAGNOSIS — D64.9 ANEMIA, UNSPECIFIED TYPE: ICD-10-CM

## 2024-10-10 LAB
FERRITIN SERPL-MCNC: 19 NG/ML (ref 8–150)
HAPTOGLOB SERPL NEPH-MCNC: 154 MG/DL (ref 30–200)
HGB RETIC QN: 30 PG (ref 28–38)
IMMATURE RETIC FRACTION: 18.2 %
IRON SATN MFR SERPL: 12 % (ref 25–45)
IRON SERPL-MCNC: 51 UG/DL (ref 35–150)
RETICS #: 0.06 X10*6/UL (ref 0.02–0.08)
RETICS/RBC NFR AUTO: 1.3 % (ref 0.5–2)
TIBC SERPL-MCNC: 412 UG/DL (ref 240–445)
UIBC SERPL-MCNC: 361 UG/DL (ref 110–370)

## 2024-10-10 PROCEDURE — 86038 ANTINUCLEAR ANTIBODIES: CPT

## 2024-10-10 PROCEDURE — 85045 AUTOMATED RETICULOCYTE COUNT: CPT

## 2024-10-10 PROCEDURE — 83540 ASSAY OF IRON: CPT

## 2024-10-10 PROCEDURE — 83550 IRON BINDING TEST: CPT

## 2024-10-10 PROCEDURE — 83010 ASSAY OF HAPTOGLOBIN QUANT: CPT

## 2024-10-10 PROCEDURE — 36415 COLL VENOUS BLD VENIPUNCTURE: CPT

## 2024-10-10 PROCEDURE — 82728 ASSAY OF FERRITIN: CPT

## 2024-10-11 LAB — ANA SER QL HEP2 SUBST: NEGATIVE

## 2024-10-22 ENCOUNTER — OFFICE VISIT (OUTPATIENT)
Dept: HEMATOLOGY/ONCOLOGY | Facility: CLINIC | Age: 44
End: 2024-10-22
Payer: COMMERCIAL

## 2024-10-22 VITALS
HEART RATE: 74 BPM | DIASTOLIC BLOOD PRESSURE: 75 MMHG | OXYGEN SATURATION: 96 % | SYSTOLIC BLOOD PRESSURE: 145 MMHG | TEMPERATURE: 97.7 F | RESPIRATION RATE: 18 BRPM

## 2024-10-22 DIAGNOSIS — D75.839 THROMBOCYTOSIS: ICD-10-CM

## 2024-10-22 DIAGNOSIS — D64.9 ANEMIA, UNSPECIFIED TYPE: ICD-10-CM

## 2024-10-22 DIAGNOSIS — D50.0 IRON DEFICIENCY ANEMIA DUE TO CHRONIC BLOOD LOSS: Primary | ICD-10-CM

## 2024-10-22 DIAGNOSIS — I10 BENIGN ESSENTIAL HTN: ICD-10-CM

## 2024-10-22 PROCEDURE — 3077F SYST BP >= 140 MM HG: CPT | Performed by: INTERNAL MEDICINE

## 2024-10-22 PROCEDURE — 3078F DIAST BP <80 MM HG: CPT | Performed by: INTERNAL MEDICINE

## 2024-10-22 PROCEDURE — 99214 OFFICE O/P EST MOD 30 MIN: CPT | Performed by: INTERNAL MEDICINE

## 2024-10-22 PROCEDURE — 99204 OFFICE O/P NEW MOD 45 MIN: CPT | Performed by: INTERNAL MEDICINE

## 2024-10-22 ASSESSMENT — PAIN SCALES - GENERAL: PAINLEVEL_OUTOF10: 0-NO PAIN

## 2024-10-22 NOTE — PATIENT INSTRUCTIONS
Right now, there is no suspicion of myeloproliferative disorder--your borderline elevated platelet count is most likely secondary to your iron deficiency    Start OTC iron supplement (any brand, follow the label), and we will recheck your counts in 3 months    Please let me know if end up not being able to tolerate oral iron, and we may then consider switching to IV

## 2024-10-22 NOTE — PROGRESS NOTES
Patient ID: Ori Dumont is a 44 y.o. female.  Referring Physician: Fredy Dumont DO  [address] [facility] [address], [address]  [place] [address]  Primary Care Provider: Fredy Dumont DO  Visit Type: Initial Visit      Subjective    HPI I have been iron deficient for many years  I have never been told to take iron supplement    Review of Systems   Constitutional: Negative.    HENT:  Negative.     Eyes: Negative.    Respiratory: Negative.     Cardiovascular: Negative.    Gastrointestinal: Negative.    Endocrine: Negative.    Genitourinary: Negative.     Musculoskeletal: Negative.    Skin: Negative.    Neurological: Negative.    Hematological: Negative.    Psychiatric/Behavioral: Negative.          Objective   BSA: There is no height or weight on file to calculate BSA.  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   has no past surgical history on file.  Family History   Problem Relation Name Age of Onset    Brain cancer Mother      Lung cancer Father      Thyroid cancer Sister      Breast cancer Sister  52     Oncology History    No history exists.       Ori Dumont  reports that she quit smoking about 13 years ago. Her smoking use included cigarettes. She has never used smokeless tobacco.  She  reports current alcohol use of about 3.0 standard drinks of alcohol per week.  She  reports no history of drug use.    Physical Exam  Vitals reviewed.   Constitutional:       Appearance: Normal appearance.   HENT:      Head: Normocephalic.      Mouth/Throat:      Mouth: Mucous membranes are moist.   Eyes:      Extraocular Movements: Extraocular movements intact.      Pupils: Pupils are equal, round, and reactive to light.   Cardiovascular:      Rate and Rhythm: Normal rate and  regular rhythm.      Pulses: Normal pulses.      Heart sounds: Normal heart sounds.   Pulmonary:      Effort: Pulmonary effort is normal.      Breath sounds: Normal breath sounds.   Abdominal:      General: Bowel sounds are normal.      Palpations: Abdomen is soft.   Musculoskeletal:         General: Normal range of motion.      Cervical back: Normal range of motion and neck supple.   Skin:     General: Skin is warm.   Neurological:      General: No focal deficit present.      Mental Status: She is alert and oriented to person, place, and time.   Psychiatric:         Mood and Affect: Mood normal.         Behavior: Behavior normal.         WBC   Date/Time Value Ref Range Status   09/12/2024 07:43 AM 5.6 4.4 - 11.3 x10*3/uL Final   08/09/2023 07:24 AM 6.1 4.4 - 11.3 x10E9/L Final   08/23/2022 07:18 AM 6.1 4.4 - 11.3 x10E9/L Final   04/29/2021 07:20 AM 5.8 4.4 - 11.3 x10E9/L Final     nRBC   Date Value Ref Range Status   09/12/2024 0.0 0.0 - 0.0 /100 WBCs Final   08/09/2023 0.0 0.0 - 0.0 /100 WBC Final   08/23/2022 0.0 0.0 - 0.0 /100 WBC Final   04/29/2021 0.0 0.0 - 0.0 /100 WBC Final     RBC   Date Value Ref Range Status   09/12/2024 4.58 4.00 - 5.20 x10*6/uL Final   08/09/2023 4.57 4.00 - 5.20 x10E12/L Final   08/23/2022 4.21 4.00 - 5.20 x10E12/L Final   04/29/2021 4.45 4.00 - 5.20 x10E12/L Final     Hemoglobin   Date Value Ref Range Status   09/12/2024 11.9 (L) 12.0 - 16.0 g/dL Final   08/09/2023 11.6 (L) 12.0 - 16.0 g/dL Final   08/23/2022 12.0 12.0 - 16.0 g/dL Final   04/29/2021 13.2 12.0 - 16.0 g/dL Final     Hematocrit   Date Value Ref Range Status   09/12/2024 38.2 36.0 - 46.0 % Final   08/09/2023 38.3 36.0 - 46.0 % Final   08/23/2022 37.5 36.0 - 46.0 % Final   04/29/2021 42.3 36.0 - 46.0 % Final     MCV   Date/Time Value Ref Range Status   09/12/2024 07:43 AM 83 80 - 100 fL Final   08/09/2023 07:24 AM 84 80 - 100 fL Final   08/23/2022 07:18 AM 89 80 - 100 fL Final   04/29/2021 07:20 AM 95 80 - 100 fL Final  "    MCH   Date/Time Value Ref Range Status   09/12/2024 07:43 AM 26.0 26.0 - 34.0 pg Final     MCHC   Date/Time Value Ref Range Status   09/12/2024 07:43 AM 31.2 (L) 32.0 - 36.0 g/dL Final   08/09/2023 07:24 AM 30.3 (L) 32.0 - 36.0 g/dL Final   08/23/2022 07:18 AM 32.0 32.0 - 36.0 g/dL Final   04/29/2021 07:20 AM 31.2 (L) 32.0 - 36.0 g/dL Final     RDW   Date/Time Value Ref Range Status   09/12/2024 07:43 AM 13.1 11.5 - 14.5 % Final   08/09/2023 07:24 AM 14.4 11.5 - 14.5 % Final   08/23/2022 07:18 AM 12.5 11.5 - 14.5 % Final   04/29/2021 07:20 AM 12.0 11.5 - 14.5 % Final     Platelets   Date/Time Value Ref Range Status   09/12/2024 07:43  (H) 150 - 450 x10*3/uL Final   08/09/2023 07:24  (H) 150 - 450 x10E9/L Final   08/23/2022 07:18  150 - 450 x10E9/L Final   04/29/2021 07:20  150 - 450 x10E9/L Final     No results found for: \"MPV\"  Neutrophils %   Date/Time Value Ref Range Status   09/12/2024 07:43 AM 50.7 40.0 - 80.0 % Final   08/09/2023 07:24 AM 55.2 40.0 - 80.0 % Final   08/23/2022 07:18 AM 57.0 40.0 - 80.0 % Final   04/29/2021 07:20 AM 55.0 40.0 - 80.0 % Final     Immature Granulocytes %, Automated   Date/Time Value Ref Range Status   09/12/2024 07:43 AM 0.2 0.0 - 0.9 % Final     Comment:     Immature Granulocyte Count (IG) includes promyelocytes, myelocytes and metamyelocytes but does not include bands. Percent differential counts (%) should be interpreted in the context of the absolute cell counts (cells/UL).   08/09/2023 07:24 AM 0.2 0.0 - 0.9 % Final     Comment:      Immature Granulocyte Count (IG) includes promyelocytes,    myelocytes and metamyelocytes but does not include bands.   Percent differential counts (%) should be interpreted in the   context of the absolute cell counts (cells/L).   08/23/2022 07:18 AM 0.2 0.0 - 0.9 % Final     Comment:      Immature Granulocyte Count (IG) includes promyelocytes,    myelocytes and metamyelocytes but does not include bands.   Percent " differential counts (%) should be interpreted in the   context of the absolute cell counts (cells/L).     04/29/2021 07:20 AM 0.3 0.0 - 0.9 % Final     Comment:      Immature Granulocyte Count (IG) includes promyelocytes,    myelocytes and metamyelocytes but does not include bands.   Percent differential counts (%) should be interpreted in the   context of the absolute cell counts (cells/L).       Lymphocytes %   Date/Time Value Ref Range Status   09/12/2024 07:43 AM 35.1 13.0 - 44.0 % Final   08/09/2023 07:24 AM 32.0 13.0 - 44.0 % Final   08/23/2022 07:18 AM 31.8 13.0 - 44.0 % Final   04/29/2021 07:20 AM 31.6 13.0 - 44.0 % Final     Monocytes %   Date/Time Value Ref Range Status   09/12/2024 07:43 AM 9.5 2.0 - 10.0 % Final   08/09/2023 07:24 AM 8.7 2.0 - 10.0 % Final   08/23/2022 07:18 AM 6.4 2.0 - 10.0 % Final   04/29/2021 07:20 AM 8.6 2.0 - 10.0 % Final     Eosinophils %   Date/Time Value Ref Range Status   09/12/2024 07:43 AM 3.2 0.0 - 6.0 % Final   08/09/2023 07:24 AM 3.1 0.0 - 6.0 % Final   08/23/2022 07:18 AM 3.6 0.0 - 6.0 % Final   04/29/2021 07:20 AM 3.6 0.0 - 6.0 % Final     Basophils %   Date/Time Value Ref Range Status   09/12/2024 07:43 AM 1.3 0.0 - 2.0 % Final   08/09/2023 07:24 AM 0.8 0.0 - 2.0 % Final   08/23/2022 07:18 AM 1.0 0.0 - 2.0 % Final   04/29/2021 07:20 AM 0.9 0.0 - 2.0 % Final     Neutrophils Absolute   Date/Time Value Ref Range Status   09/12/2024 07:43 AM 2.83 1.20 - 7.70 x10*3/uL Final     Comment:     Percent differential counts (%) should be interpreted in the context of the absolute cell counts (cells/uL).   08/09/2023 07:24 AM 3.38 1.20 - 7.70 x10E9/L Final   08/23/2022 07:18 AM 3.51 1.20 - 7.70 x10E9/L Final   04/29/2021 07:20 AM 3.21 1.20 - 7.70 x10E9/L Final     Immature Granulocytes Absolute, Automated   Date/Time Value Ref Range Status   09/12/2024 07:43 AM 0.01 0.00 - 0.70 x10*3/uL Final     Lymphocytes Absolute   Date/Time Value Ref Range Status   09/12/2024 07:43 AM 1.96  "1.20 - 4.80 x10*3/uL Final   08/09/2023 07:24 AM 1.96 1.20 - 4.80 x10E9/L Final   08/23/2022 07:18 AM 1.95 1.20 - 4.80 x10E9/L Final   04/29/2021 07:20 AM 1.84 1.20 - 4.80 x10E9/L Final     Monocytes Absolute   Date/Time Value Ref Range Status   09/12/2024 07:43 AM 0.53 0.10 - 1.00 x10*3/uL Final   08/09/2023 07:24 AM 0.53 0.10 - 1.00 x10E9/L Final   08/23/2022 07:18 AM 0.39 0.10 - 1.00 x10E9/L Final   04/29/2021 07:20 AM 0.50 0.10 - 1.00 x10E9/L Final     Eosinophils Absolute   Date/Time Value Ref Range Status   09/12/2024 07:43 AM 0.18 0.00 - 0.70 x10*3/uL Final   08/09/2023 07:24 AM 0.19 0.00 - 0.70 x10E9/L Final   08/23/2022 07:18 AM 0.22 0.00 - 0.70 x10E9/L Final   04/29/2021 07:20 AM 0.21 0.00 - 0.70 x10E9/L Final     Basophils Absolute   Date/Time Value Ref Range Status   09/12/2024 07:43 AM 0.07 0.00 - 0.10 x10*3/uL Final   08/09/2023 07:24 AM 0.05 0.00 - 0.10 x10E9/L Final   08/23/2022 07:18 AM 0.06 0.00 - 0.10 x10E9/L Final   04/29/2021 07:20 AM 0.05 0.00 - 0.10 x10E9/L Final       No components found for: \"PT\"  No results found for: \"APTT\"  Medication Documentation Review Audit       Reviewed by Gisele Huffman MA (Medical Assistant) on 10/22/24 at 1311      Medication Order Taking? Sig Documenting Provider Last Dose Status   amLODIPine (Norvasc) 10 mg tablet [id number] Yes Take 1 tablet (10 mg) by mouth once daily. For blood pressure Fredy Dumont, DO  Active   hydroCHLOROthiazide (Microzide) 12.5 mg capsule [id number] Yes Take 1 capsule (12.5 mg) by mouth once daily in the morning. Fredy Dumont, DO  Active                   Assessment/Plan    1) anemia  -I reviewed her lab history in the EMR  -4/29/2021 wbc 5.8, hgb 13.2  -8/23/2022 wbc 6.1, hgb 12  -8/9/2023 wbc 6.1, hgb 11.6, MCV 84  -9/12/2024 wbc 5.6, hgb 11.9, MCV 83  -10/10/2024 TIBC 412, sat 12%, ferritin 19, retic 1.3%, haptoglobin 154  -she is recently noted to be iron deficient, however, she says she has known that she is iron deficient " (secondary to heavy periods) for many years  -despite that she says she has never been advised to take PO iron supplements before  -advised Susan to start OTC PO iron supplementation  -will recheck CBC/iron indices in 3 months    2) thrombocytosis  -4/29/2021 plt 367,000  -8/23/2022 plt 389,000  -8/9/2023 plt 473,000  -9/12/2024 plt 488,000  -most likely this is reactive thrombocytosis secondary to iron deficiency  -platelet count should normalize once iron deficit corrected    3) hypertension  -on amlodipine  -on hydrochlorothiazide     Problem List Items Addressed This Visit    None  Visit Diagnoses         Codes    Anemia, unspecified type     D64.9    Possible iron deficiency less likely other     Relevant Orders    Clinic Appointment Request Other (comment) (telephone followup); MAYNOR PÉREZ; [facility] MEDON[facility]    CBC and Auto Differential    Iron and TIBC    Ferritin    Thrombocytosis     D75.839    Inflammatory rule out myeloproliferative disorder versus other no signs of infection or inflammation otherwise versus autoimmune                  Maynor Pérez MD

## 2024-10-24 PROBLEM — D64.9 ANEMIA: Status: ACTIVE | Noted: 2024-10-24

## 2024-10-24 PROBLEM — D75.839 THROMBOCYTOSIS: Status: ACTIVE | Noted: 2024-10-24

## 2024-10-24 ASSESSMENT — ENCOUNTER SYMPTOMS
PSYCHIATRIC NEGATIVE: 1
HEMATOLOGIC/LYMPHATIC NEGATIVE: 1
CARDIOVASCULAR NEGATIVE: 1
NEUROLOGICAL NEGATIVE: 1
CONSTITUTIONAL NEGATIVE: 1
GASTROINTESTINAL NEGATIVE: 1
EYES NEGATIVE: 1
ENDOCRINE NEGATIVE: 1
RESPIRATORY NEGATIVE: 1
MUSCULOSKELETAL NEGATIVE: 1

## 2025-01-17 ENCOUNTER — LAB (OUTPATIENT)
Dept: LAB | Facility: LAB | Age: 45
End: 2025-01-17
Payer: COMMERCIAL

## 2025-01-17 DIAGNOSIS — D64.9 ANEMIA, UNSPECIFIED TYPE: ICD-10-CM

## 2025-01-17 LAB
BASOPHILS # BLD AUTO: 0.08 X10*3/UL (ref 0–0.1)
BASOPHILS NFR BLD AUTO: 1 %
EOSINOPHIL # BLD AUTO: 0.23 X10*3/UL (ref 0–0.7)
EOSINOPHIL NFR BLD AUTO: 3 %
ERYTHROCYTE [DISTWIDTH] IN BLOOD BY AUTOMATED COUNT: 13.2 % (ref 11.5–14.5)
FERRITIN SERPL-MCNC: 30 NG/ML (ref 8–150)
HCT VFR BLD AUTO: 41 % (ref 36–46)
HGB BLD-MCNC: 13.2 G/DL (ref 12–16)
IMM GRANULOCYTES # BLD AUTO: 0.01 X10*3/UL (ref 0–0.7)
IMM GRANULOCYTES NFR BLD AUTO: 0.1 % (ref 0–0.9)
IRON SATN MFR SERPL: 20 % (ref 25–45)
IRON SERPL-MCNC: 87 UG/DL (ref 35–150)
LYMPHOCYTES # BLD AUTO: 2.2 X10*3/UL (ref 1.2–4.8)
LYMPHOCYTES NFR BLD AUTO: 28.6 %
MCH RBC QN AUTO: 28.6 PG (ref 26–34)
MCHC RBC AUTO-ENTMCNC: 32.2 G/DL (ref 32–36)
MCV RBC AUTO: 89 FL (ref 80–100)
MONOCYTES # BLD AUTO: 0.61 X10*3/UL (ref 0.1–1)
MONOCYTES NFR BLD AUTO: 7.9 %
NEUTROPHILS # BLD AUTO: 4.55 X10*3/UL (ref 1.2–7.7)
NEUTROPHILS NFR BLD AUTO: 59.4 %
NRBC BLD-RTO: 0 /100 WBCS (ref 0–0)
PLATELET # BLD AUTO: 449 X10*3/UL (ref 150–450)
RBC # BLD AUTO: 4.61 X10*6/UL (ref 4–5.2)
TIBC SERPL-MCNC: 439 UG/DL (ref 240–445)
UIBC SERPL-MCNC: 352 UG/DL (ref 110–370)
WBC # BLD AUTO: 7.7 X10*3/UL (ref 4.4–11.3)

## 2025-01-17 PROCEDURE — 83550 IRON BINDING TEST: CPT

## 2025-01-17 PROCEDURE — 83540 ASSAY OF IRON: CPT

## 2025-01-17 PROCEDURE — 82728 ASSAY OF FERRITIN: CPT

## 2025-01-17 PROCEDURE — 85025 COMPLETE CBC W/AUTO DIFF WBC: CPT

## 2025-01-21 ENCOUNTER — TELEMEDICINE (OUTPATIENT)
Dept: HEMATOLOGY/ONCOLOGY | Facility: CLINIC | Age: 45
End: 2025-01-21
Payer: COMMERCIAL

## 2025-01-21 DIAGNOSIS — I10 BENIGN ESSENTIAL HTN: ICD-10-CM

## 2025-01-21 DIAGNOSIS — D75.839 THROMBOCYTOSIS: ICD-10-CM

## 2025-01-21 DIAGNOSIS — D64.9 ANEMIA, UNSPECIFIED TYPE: ICD-10-CM

## 2025-01-21 DIAGNOSIS — D50.0 IRON DEFICIENCY ANEMIA DUE TO CHRONIC BLOOD LOSS: Primary | ICD-10-CM

## 2025-01-21 ASSESSMENT — ENCOUNTER SYMPTOMS
CARDIOVASCULAR NEGATIVE: 1
EYES NEGATIVE: 1
RESPIRATORY NEGATIVE: 1
MUSCULOSKELETAL NEGATIVE: 1
CONSTITUTIONAL NEGATIVE: 1
GASTROINTESTINAL NEGATIVE: 1
ENDOCRINE NEGATIVE: 1
PSYCHIATRIC NEGATIVE: 1
HEMATOLOGIC/LYMPHATIC NEGATIVE: 1
NEUROLOGICAL NEGATIVE: 1

## 2025-01-21 NOTE — PROGRESS NOTES
Patient ID: Ori Dumont is a 44 y.o. female.  Referring Physician: Maynor Pérez MD  [address]  [place] [address]  Primary Care Provider: Fredy Dumont DO  Visit Type:  Follow Up     Verbal consent was requested and obtained from patient on this date for a telehealth visit.    Subjective    HPI How was my bloodwork?    Review of Systems   Constitutional: Negative.    HENT:  Negative.     Eyes: Negative.    Respiratory: Negative.     Cardiovascular: Negative.    Gastrointestinal: Negative.    Endocrine: Negative.    Genitourinary: Negative.     Musculoskeletal: Negative.    Skin: Negative.    Neurological: Negative.    Hematological: Negative.    Psychiatric/Behavioral: Negative.          Objective   BSA: There is no height or weight on file to calculate BSA.  There were no vitals taken for this visit.     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   has no past surgical history on file.  Family History   Problem Relation Name Age of Onset    Brain cancer Mother      Lung cancer Father      Thyroid cancer Sister      Breast cancer Sister  52     Oncology History    No history exists.       Ori Dumont  reports that she quit smoking about 14 years ago. Her smoking use included cigarettes. She has never used smokeless tobacco.  She  reports current alcohol use of about 3.0 standard drinks of alcohol per week.  She  reports no history of drug use.    Physical Exam    WBC   Date/Time Value Ref Range Status   01/17/2025 12:25 PM 7.7 4.4 - 11.3 x10*3/uL Final   09/12/2024 07:43 AM 5.6 4.4 - 11.3 x10*3/uL Final   08/09/2023 07:24 AM 6.1 4.4 - 11.3 x10E9/L Final   08/23/2022 07:18 AM 6.1 4.4 - 11.3 x10E9/L Final   04/29/2021 07:20 AM 5.8 4.4 - 11.3 x10E9/L Final     nRBC   Date Value Ref Range Status   01/17/2025 0.0 0.0 - 0.0 /100 WBCs Final   09/12/2024 0.0 0.0 - 0.0 /100 WBCs Final    08/09/2023 0.0 0.0 - 0.0 /100 WBC Final   08/23/2022 0.0 0.0 - 0.0 /100 WBC Final   04/29/2021 0.0 0.0 - 0.0 /100 WBC Final     RBC   Date Value Ref Range Status   01/17/2025 4.61 4.00 - 5.20 x10*6/uL Final   09/12/2024 4.58 4.00 - 5.20 x10*6/uL Final   08/09/2023 4.57 4.00 - 5.20 x10E12/L Final   08/23/2022 4.21 4.00 - 5.20 x10E12/L Final   04/29/2021 4.45 4.00 - 5.20 x10E12/L Final     Hemoglobin   Date Value Ref Range Status   01/17/2025 13.2 12.0 - 16.0 g/dL Final   09/12/2024 11.9 (L) 12.0 - 16.0 g/dL Final   08/09/2023 11.6 (L) 12.0 - 16.0 g/dL Final   08/23/2022 12.0 12.0 - 16.0 g/dL Final   04/29/2021 13.2 12.0 - 16.0 g/dL Final     Hematocrit   Date Value Ref Range Status   01/17/2025 41.0 36.0 - 46.0 % Final   09/12/2024 38.2 36.0 - 46.0 % Final   08/09/2023 38.3 36.0 - 46.0 % Final   08/23/2022 37.5 36.0 - 46.0 % Final   04/29/2021 42.3 36.0 - 46.0 % Final     MCV   Date/Time Value Ref Range Status   01/17/2025 12:25 PM 89 80 - 100 fL Final   09/12/2024 07:43 AM 83 80 - 100 fL Final   08/09/2023 07:24 AM 84 80 - 100 fL Final   08/23/2022 07:18 AM 89 80 - 100 fL Final   04/29/2021 07:20 AM 95 80 - 100 fL Final     MCH   Date/Time Value Ref Range Status   01/17/2025 12:25 PM 28.6 26.0 - 34.0 pg Final   09/12/2024 07:43 AM 26.0 26.0 - 34.0 pg Final     MCHC   Date/Time Value Ref Range Status   01/17/2025 12:25 PM 32.2 32.0 - 36.0 g/dL Final   09/12/2024 07:43 AM 31.2 (L) 32.0 - 36.0 g/dL Final   08/09/2023 07:24 AM 30.3 (L) 32.0 - 36.0 g/dL Final   08/23/2022 07:18 AM 32.0 32.0 - 36.0 g/dL Final   04/29/2021 07:20 AM 31.2 (L) 32.0 - 36.0 g/dL Final     RDW   Date/Time Value Ref Range Status   01/17/2025 12:25 PM 13.2 11.5 - 14.5 % Final   09/12/2024 07:43 AM 13.1 11.5 - 14.5 % Final   08/09/2023 07:24 AM 14.4 11.5 - 14.5 % Final   08/23/2022 07:18 AM 12.5 11.5 - 14.5 % Final   04/29/2021 07:20 AM 12.0 11.5 - 14.5 % Final     Platelets   Date/Time Value Ref Range Status   01/17/2025 12:25  150 - 450  "x10*3/uL Final   09/12/2024 07:43  (H) 150 - 450 x10*3/uL Final   08/09/2023 07:24  (H) 150 - 450 x10E9/L Final   08/23/2022 07:18  150 - 450 x10E9/L Final   04/29/2021 07:20  150 - 450 x10E9/L Final     No results found for: \"MPV\"  Neutrophils %   Date/Time Value Ref Range Status   01/17/2025 12:25 PM 59.4 40.0 - 80.0 % Final   09/12/2024 07:43 AM 50.7 40.0 - 80.0 % Final   08/09/2023 07:24 AM 55.2 40.0 - 80.0 % Final   08/23/2022 07:18 AM 57.0 40.0 - 80.0 % Final   04/29/2021 07:20 AM 55.0 40.0 - 80.0 % Final     Immature Granulocytes %, Automated   Date/Time Value Ref Range Status   01/17/2025 12:25 PM 0.1 0.0 - 0.9 % Final     Comment:     Immature Granulocyte Count (IG) includes promyelocytes, myelocytes and metamyelocytes but does not include bands. Percent differential counts (%) should be interpreted in the context of the absolute cell counts (cells/UL).   09/12/2024 07:43 AM 0.2 0.0 - 0.9 % Final     Comment:     Immature Granulocyte Count (IG) includes promyelocytes, myelocytes and metamyelocytes but does not include bands. Percent differential counts (%) should be interpreted in the context of the absolute cell counts (cells/UL).   08/09/2023 07:24 AM 0.2 0.0 - 0.9 % Final     Comment:      Immature Granulocyte Count (IG) includes promyelocytes,    myelocytes and metamyelocytes but does not include bands.   Percent differential counts (%) should be interpreted in the   context of the absolute cell counts (cells/L).   08/23/2022 07:18 AM 0.2 0.0 - 0.9 % Final     Comment:      Immature Granulocyte Count (IG) includes promyelocytes,    myelocytes and metamyelocytes but does not include bands.   Percent differential counts (%) should be interpreted in the   context of the absolute cell counts (cells/L).     04/29/2021 07:20 AM 0.3 0.0 - 0.9 % Final     Comment:      Immature Granulocyte Count (IG) includes promyelocytes,    myelocytes and metamyelocytes but does not include bands.   " Percent differential counts (%) should be interpreted in the   context of the absolute cell counts (cells/L).       Lymphocytes %   Date/Time Value Ref Range Status   01/17/2025 12:25 PM 28.6 13.0 - 44.0 % Final   09/12/2024 07:43 AM 35.1 13.0 - 44.0 % Final   08/09/2023 07:24 AM 32.0 13.0 - 44.0 % Final   08/23/2022 07:18 AM 31.8 13.0 - 44.0 % Final   04/29/2021 07:20 AM 31.6 13.0 - 44.0 % Final     Monocytes %   Date/Time Value Ref Range Status   01/17/2025 12:25 PM 7.9 2.0 - 10.0 % Final   09/12/2024 07:43 AM 9.5 2.0 - 10.0 % Final   08/09/2023 07:24 AM 8.7 2.0 - 10.0 % Final   08/23/2022 07:18 AM 6.4 2.0 - 10.0 % Final   04/29/2021 07:20 AM 8.6 2.0 - 10.0 % Final     Eosinophils %   Date/Time Value Ref Range Status   01/17/2025 12:25 PM 3.0 0.0 - 6.0 % Final   09/12/2024 07:43 AM 3.2 0.0 - 6.0 % Final   08/09/2023 07:24 AM 3.1 0.0 - 6.0 % Final   08/23/2022 07:18 AM 3.6 0.0 - 6.0 % Final   04/29/2021 07:20 AM 3.6 0.0 - 6.0 % Final     Basophils %   Date/Time Value Ref Range Status   01/17/2025 12:25 PM 1.0 0.0 - 2.0 % Final   09/12/2024 07:43 AM 1.3 0.0 - 2.0 % Final   08/09/2023 07:24 AM 0.8 0.0 - 2.0 % Final   08/23/2022 07:18 AM 1.0 0.0 - 2.0 % Final   04/29/2021 07:20 AM 0.9 0.0 - 2.0 % Final     Neutrophils Absolute   Date/Time Value Ref Range Status   01/17/2025 12:25 PM 4.55 1.20 - 7.70 x10*3/uL Final     Comment:     Percent differential counts (%) should be interpreted in the context of the absolute cell counts (cells/uL).   09/12/2024 07:43 AM 2.83 1.20 - 7.70 x10*3/uL Final     Comment:     Percent differential counts (%) should be interpreted in the context of the absolute cell counts (cells/uL).   08/09/2023 07:24 AM 3.38 1.20 - 7.70 x10E9/L Final   08/23/2022 07:18 AM 3.51 1.20 - 7.70 x10E9/L Final   04/29/2021 07:20 AM 3.21 1.20 - 7.70 x10E9/L Final     Immature Granulocytes Absolute, Automated   Date/Time Value Ref Range Status   01/17/2025 12:25 PM 0.01 0.00 - 0.70 x10*3/uL Final   09/12/2024  "07:43 AM 0.01 0.00 - 0.70 x10*3/uL Final     Lymphocytes Absolute   Date/Time Value Ref Range Status   01/17/2025 12:25 PM 2.20 1.20 - 4.80 x10*3/uL Final   09/12/2024 07:43 AM 1.96 1.20 - 4.80 x10*3/uL Final   08/09/2023 07:24 AM 1.96 1.20 - 4.80 x10E9/L Final   08/23/2022 07:18 AM 1.95 1.20 - 4.80 x10E9/L Final   04/29/2021 07:20 AM 1.84 1.20 - 4.80 x10E9/L Final     Monocytes Absolute   Date/Time Value Ref Range Status   01/17/2025 12:25 PM 0.61 0.10 - 1.00 x10*3/uL Final   09/12/2024 07:43 AM 0.53 0.10 - 1.00 x10*3/uL Final   08/09/2023 07:24 AM 0.53 0.10 - 1.00 x10E9/L Final   08/23/2022 07:18 AM 0.39 0.10 - 1.00 x10E9/L Final   04/29/2021 07:20 AM 0.50 0.10 - 1.00 x10E9/L Final     Eosinophils Absolute   Date/Time Value Ref Range Status   01/17/2025 12:25 PM 0.23 0.00 - 0.70 x10*3/uL Final   09/12/2024 07:43 AM 0.18 0.00 - 0.70 x10*3/uL Final   08/09/2023 07:24 AM 0.19 0.00 - 0.70 x10E9/L Final   08/23/2022 07:18 AM 0.22 0.00 - 0.70 x10E9/L Final   04/29/2021 07:20 AM 0.21 0.00 - 0.70 x10E9/L Final     Basophils Absolute   Date/Time Value Ref Range Status   01/17/2025 12:25 PM 0.08 0.00 - 0.10 x10*3/uL Final   09/12/2024 07:43 AM 0.07 0.00 - 0.10 x10*3/uL Final   08/09/2023 07:24 AM 0.05 0.00 - 0.10 x10E9/L Final   08/23/2022 07:18 AM 0.06 0.00 - 0.10 x10E9/L Final   04/29/2021 07:20 AM 0.05 0.00 - 0.10 x10E9/L Final       No components found for: \"PT\"  No results found for: \"APTT\"  Medication Documentation Review Audit       Reviewed by Gisele Huffman MA (Medical Assistant) on 10/22/24 at 1311      Medication Order Taking? Sig Documenting Provider Last Dose Status   amLODIPine (Norvasc) 10 mg tablet [id number] Yes Take 1 tablet (10 mg) by mouth once daily. For blood pressure Fredy Dumont, DO  Active   hydroCHLOROthiazide (Microzide) 12.5 mg capsule [id number] Yes Take 1 capsule (12.5 mg) by mouth once daily in the morning. Fredy Dumont, DO  Active                   Assessment/Plan    1) anemia  -I reviewed her " lab history in the EMR  -4/29/2021 wbc 5.8, hgb 13.2  -8/23/2022 wbc 6.1, hgb 12  -8/9/2023 wbc 6.1, hgb 11.6, MCV 84  -9/12/2024 wbc 5.6, hgb 11.9, MCV 83  -10/10/2024 TIBC 412, sat 12%, ferritin 19, retic 1.3%, haptoglobin 154  -she is recently noted to be iron deficient, however, she says she has known that she is iron deficient (secondary to heavy periods) for many years  -despite that she says she has never been advised to take PO iron supplements before  -advised Susan to start OTC PO iron supplementation  -will recheck CBC/iron indices in 3 months  -here for interval followup via telephone  -has been taking PO iron daily  -she describes her periods as irregular  -labs done on 1/17/2025 included CBC + iron panel + ferritin  -results reviewed--wbc 7.7, hgb 13.2, plt 449,000, TIBC 439, sat 20%, ferritin 30  -as iron is relatively insoluble, and body cannot excrete and excess, advised Ori to back off on iron supplementation to just 3 x a week  -will recheck in 6 months     2) thrombocytosis  -4/29/2021 plt 367,000  -8/23/2022 plt 389,000  -8/9/2023 plt 473,000  -9/12/2024 plt 488,000  -most likely this is reactive thrombocytosis secondary to iron deficiency  -platelet count should normalize once iron deficit corrected     3) hypertension  -on amlodipine  -on hydrochlorothiazide       Problem List Items Addressed This Visit             ICD-10-CM    Anemia D64.9            Maynor Pérez MD

## 2025-03-14 ENCOUNTER — APPOINTMENT (OUTPATIENT)
Dept: PRIMARY CARE | Facility: CLINIC | Age: 45
End: 2025-03-14
Payer: COMMERCIAL

## 2025-03-14 VITALS — SYSTOLIC BLOOD PRESSURE: 122 MMHG | DIASTOLIC BLOOD PRESSURE: 84 MMHG

## 2025-03-14 DIAGNOSIS — I10 BENIGN ESSENTIAL HTN: ICD-10-CM

## 2025-03-14 DIAGNOSIS — R53.83 OTHER FATIGUE: ICD-10-CM

## 2025-03-14 DIAGNOSIS — Z12.31 VISIT FOR SCREENING MAMMOGRAM: ICD-10-CM

## 2025-03-14 DIAGNOSIS — E78.5 HYPERLIPIDEMIA, UNSPECIFIED HYPERLIPIDEMIA TYPE: Primary | ICD-10-CM

## 2025-03-14 DIAGNOSIS — I10 HYPERTENSION, UNSPECIFIED TYPE: ICD-10-CM

## 2025-03-14 PROCEDURE — 99214 OFFICE O/P EST MOD 30 MIN: CPT | Performed by: INTERNAL MEDICINE

## 2025-03-14 PROCEDURE — 3074F SYST BP LT 130 MM HG: CPT | Performed by: INTERNAL MEDICINE

## 2025-03-14 PROCEDURE — 3079F DIAST BP 80-89 MM HG: CPT | Performed by: INTERNAL MEDICINE

## 2025-03-14 PROCEDURE — 1036F TOBACCO NON-USER: CPT | Performed by: INTERNAL MEDICINE

## 2025-03-14 RX ORDER — AMLODIPINE BESYLATE 10 MG/1
10 TABLET ORAL DAILY
Qty: 90 TABLET | Refills: 1 | Status: SHIPPED | OUTPATIENT
Start: 2025-03-14

## 2025-03-14 RX ORDER — HYDROCHLOROTHIAZIDE 12.5 MG/1
12.5 CAPSULE ORAL EVERY MORNING
Qty: 90 CAPSULE | Refills: 1 | Status: SHIPPED | OUTPATIENT
Start: 2025-03-14

## 2025-03-14 NOTE — PROGRESS NOTES
Subjective   Patient ID: Ori Dumont is a 44 y.o. female who presents for Follow-up and Med Refill.  HPI        past medical history hypothyroidism right lower extremity venous reflux     Overall feeling well  some fatigue for several months grade 2 out of 10 nothing makes better or worse  Maybe some associated brain type fog  She does note decrease in her menstrual cycle though when she gets them there may be more heavy  She is otherwise working out 4 times a week at the gym weight lifting  Planning to start a new job based in [place]  Denies fever chills night sweats unexplained weight loss        Health Maintenance:      Colonoscopy:      Mammogram: 2024      Pelvic/Pap:      Low dose chest CT:      Aorta duplex:      Optho:      Podiatry:        Vaccines:      Refer to Epic Vaccination Log        ROS:      General: Fatigue denies fever/chills/weight loss      Head: Occasional brain fog; sinus congestion several weeks ago got better with humidifier denies HA/trauma/masses/dizziness      Eyes: denies vision change/loss of vision/blurry vision/diplopia/eye pain      Ears: denies hearing loss/tinnitus/otalgia/otorrhea      Nose: denies nasal drainage/anosmia      Throat: denies dysphagia/odynophagia      Lymphatics: denies lymph node swelling      Breast: denies masses/discharge/dimpling/skin changes      Cardiac: denies CP/palpitations/orthopnea/PND      Pulmonary: denies dyspnea/cough/wheezing      GI: denies abd pain/n/v/diarrhea/melena/hematochezia/hematemesis      : denies dysuria/hematuria/change frequency      Genital: denies genital discharge/lesions      Skin:  denies rashes/lesions/masses      MSK: denies weakness/swelling/edema/gait imbalance/pain      Neuro: denies paresthesias/seizures/dysarthria      Psych: denies depression/anxiety/suicidal or homicidal ideations            Objective   /84      Physical Exam:     General: AO3, NAD     Head: atraumatic/NC     Eyes: EOMI/PERRLA. Negative APD     " Ears: TM pearly gray, EAC clear. No lesions or erythema     Nose: symmetric nares, no discharge     Throat: trachea midline, uvula midline pink mucosa. No thyromegaly     Lymphatics: no cervical/supraclavicular/ant or posterior cervical adenopathy/axillary/inguinal adenopathy     Breast: not examined     Chest: no deformity or tenderness to palpation     Pulm: CTA b/l, no wheeze/rhonchi/rales. nonlabored     Cardiac: RRR +s1s2, no m/r/g.      GI: soft, NT/ND. Normoactive Bsx4. No rebound/guarding.     Rectal: not examined     Genital: not examined     MSK: 5/5 strength UE LE. No edema/clubbing/cyanosis     Skin:  no rashes/lesions     Vascular: 2+ palp DP PT radials b/l. Negative carotid bruit     Neuro: CNII-XII intact. No focal deficits. Reflexes 2/4 brachioradialis bicep tricep patellar achilles. Finger to nose intact.     Psych: appropriate mood/affect                    No results found for: \"BMPR1A\", \"CBCDIF\"  Assessment/Plan   Diagnoses and all orders for this visit:  Hyperlipidemia, unspecified hyperlipidemia type  -     Lipid Panel; Future  Hypertension, unspecified type  -     hydroCHLOROthiazide (Microzide) 12.5 mg capsule; Take 1 capsule (12.5 mg) by mouth once daily in the morning.  -     amLODIPine (Norvasc) 10 mg tablet; Take 1 tablet (10 mg) by mouth once daily. For blood pressure  Visit for screening mammogram  -     BI mammo bilateral screening tomosynthesis; Future  Benign essential HTN    Call and follow-up with hematology recommendations noted iron supplementation follow-up in 6 months lab work  If brain fog or symptoms persist will need neuropsychiatry referral and further evaluation    Continue the excellent job with exercising and targeting weight loss      Recommend taking blood pressure medicines as ordered goal blood pressure less than 140/90    Recheck lipid panel  Screening blood work due September 2025 will also check hormones then including FSH LH estrogen    Thank you for making " appointment today Ori    Please stop at the  to schedule follow-up in 6 months as we discussed     Fredy Dumont DO, NORBERTO Dumont DO

## 2025-07-14 ENCOUNTER — APPOINTMENT (OUTPATIENT)
Dept: HEMATOLOGY/ONCOLOGY | Facility: CLINIC | Age: 45
End: 2025-07-14
Payer: COMMERCIAL

## 2025-07-16 ENCOUNTER — LAB (OUTPATIENT)
Dept: LAB | Facility: HOSPITAL | Age: 45
End: 2025-07-16
Payer: COMMERCIAL

## 2025-07-16 DIAGNOSIS — D50.0 IRON DEFICIENCY ANEMIA SECONDARY TO BLOOD LOSS (CHRONIC): Primary | ICD-10-CM

## 2025-07-16 DIAGNOSIS — D50.0 IRON DEFICIENCY ANEMIA DUE TO CHRONIC BLOOD LOSS: ICD-10-CM

## 2025-07-16 LAB
BASOPHILS # BLD AUTO: 0.07 X10*3/UL (ref 0–0.1)
BASOPHILS NFR BLD AUTO: 1.1 %
EOSINOPHIL # BLD AUTO: 0.27 X10*3/UL (ref 0–0.7)
EOSINOPHIL NFR BLD AUTO: 4.3 %
ERYTHROCYTE [DISTWIDTH] IN BLOOD BY AUTOMATED COUNT: 12.1 % (ref 11.5–14.5)
FERRITIN SERPL-MCNC: 36 NG/ML (ref 8–150)
HCT VFR BLD AUTO: 41.9 % (ref 36–46)
HGB BLD-MCNC: 13.5 G/DL (ref 12–16)
IMM GRANULOCYTES # BLD AUTO: 0.01 X10*3/UL (ref 0–0.7)
IMM GRANULOCYTES NFR BLD AUTO: 0.2 % (ref 0–0.9)
IRON SATN MFR SERPL: 30 % (ref 25–45)
IRON SERPL-MCNC: 121 UG/DL (ref 35–150)
LYMPHOCYTES # BLD AUTO: 1.92 X10*3/UL (ref 1.2–4.8)
LYMPHOCYTES NFR BLD AUTO: 30.9 %
MCH RBC QN AUTO: 28.6 PG (ref 26–34)
MCHC RBC AUTO-ENTMCNC: 32.2 G/DL (ref 32–36)
MCV RBC AUTO: 89 FL (ref 80–100)
MONOCYTES # BLD AUTO: 0.47 X10*3/UL (ref 0.1–1)
MONOCYTES NFR BLD AUTO: 7.6 %
NEUTROPHILS # BLD AUTO: 3.48 X10*3/UL (ref 1.2–7.7)
NEUTROPHILS NFR BLD AUTO: 55.9 %
NRBC BLD-RTO: 0 /100 WBCS (ref 0–0)
PLATELET # BLD AUTO: 381 X10*3/UL (ref 150–450)
RBC # BLD AUTO: 4.72 X10*6/UL (ref 4–5.2)
TIBC SERPL-MCNC: 405 UG/DL (ref 240–445)
UIBC SERPL-MCNC: 284 UG/DL (ref 110–370)
WBC # BLD AUTO: 6.2 X10*3/UL (ref 4.4–11.3)

## 2025-07-16 PROCEDURE — 83540 ASSAY OF IRON: CPT

## 2025-07-16 PROCEDURE — 36415 COLL VENOUS BLD VENIPUNCTURE: CPT

## 2025-07-16 PROCEDURE — 85025 COMPLETE CBC W/AUTO DIFF WBC: CPT

## 2025-07-16 PROCEDURE — 82728 ASSAY OF FERRITIN: CPT

## 2025-07-16 PROCEDURE — 83550 IRON BINDING TEST: CPT

## 2025-07-21 ENCOUNTER — TELEMEDICINE (OUTPATIENT)
Dept: HEMATOLOGY/ONCOLOGY | Facility: CLINIC | Age: 45
End: 2025-07-21
Payer: COMMERCIAL

## 2025-07-21 DIAGNOSIS — D50.0 IRON DEFICIENCY ANEMIA DUE TO CHRONIC BLOOD LOSS: ICD-10-CM

## 2025-07-21 PROCEDURE — 99213 OFFICE O/P EST LOW 20 MIN: CPT | Performed by: INTERNAL MEDICINE

## 2025-07-21 NOTE — PROGRESS NOTES
Patient ID: Ori Dumont is a 45 y.o. female.  Referring Physician: Maynor Pérez MD  [id number]  [place] [address]  Primary Care Provider: Fredy Dumont DO  Visit Type:  Follow Up     Virtual or Telephone Consent    An interactive audio and video telecommunication system which permits real time communications between the patient (at the originating site) and provider (at the distant site) was utilized to provide this telehealth service.   Verbal consent was requested and obtained from Ori Dumont on this date, 07/21/2025 for a telehealth visit and the patient's location was confirmed at the time of the visit.     Subjective    HPI what did my labwork show?    Review of Systems   Constitutional: Negative.    HENT:  Negative.     Eyes: Negative.    Respiratory: Negative.     Cardiovascular: Negative.    Gastrointestinal: Negative.    Endocrine: Negative.    Genitourinary: Negative.     Musculoskeletal: Negative.    Skin: Negative.    Neurological: Negative.    Hematological: Negative.    Psychiatric/Behavioral: Negative.          Objective   BSA: There is no height or weight on file to calculate BSA.  There were no vitals taken for this visit.     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   has no past surgical history on file.  Family History[1]  Oncology History    No history exists.       Ori Dumont  reports that she quit smoking about 14 years ago. Her smoking use included cigarettes. She has never used smokeless tobacco.  She  reports current alcohol use of about 3.0 standard drinks of alcohol per week.  She  reports no history of drug use.    Physical Exam    WBC   Date/Time Value Ref Range Status   07/16/2025 07:33 AM 6.2 4.4 - 11.3 x10*3/uL Final   01/17/2025 12:25 PM 7.7 4.4 - 11.3 x10*3/uL Final   09/12/2024 07:43 AM 5.6 4.4 - 11.3 x10*3/uL Final     [facility]   Date Value  "Ref Range Status   07/16/2025 0.0 0.0 - 0.0 /100 WBCs Final   01/17/2025 0.0 0.0 - 0.0 /100 WBCs Final   09/12/2024 0.0 0.0 - 0.0 /100 WBCs Final     RBC   Date Value Ref Range Status   07/16/2025 4.72 4.00 - 5.20 x10*6/uL Final   01/17/2025 4.61 4.00 - 5.20 x10*6/uL Final   09/12/2024 4.58 4.00 - 5.20 x10*6/uL Final     Hemoglobin   Date Value Ref Range Status   07/16/2025 13.5 12.0 - 16.0 g/dL Final   01/17/2025 13.2 12.0 - 16.0 g/dL Final   09/12/2024 11.9 (L) 12.0 - 16.0 g/dL Final     Hematocrit   Date Value Ref Range Status   07/16/2025 41.9 36.0 - 46.0 % Final   01/17/2025 41.0 36.0 - 46.0 % Final   09/12/2024 38.2 36.0 - 46.0 % Final     MCV   Date/Time Value Ref Range Status   07/16/2025 07:33 AM 89 80 - 100 fL Final   01/17/2025 12:25 PM 89 80 - 100 fL Final   09/12/2024 07:43 AM 83 80 - 100 fL Final     MCH   Date/Time Value Ref Range Status   07/16/2025 07:33 AM 28.6 26.0 - 34.0 pg Final   01/17/2025 12:25 PM 28.6 26.0 - 34.0 pg Final   09/12/2024 07:43 AM 26.0 26.0 - 34.0 pg Final     MCHC   Date/Time Value Ref Range Status   07/16/2025 07:33 AM 32.2 32.0 - 36.0 g/dL Final   01/17/2025 12:25 PM 32.2 32.0 - 36.0 g/dL Final   09/12/2024 07:43 AM 31.2 (L) 32.0 - 36.0 g/dL Final     RDW   Date/Time Value Ref Range Status   07/16/2025 07:33 AM 12.1 11.5 - 14.5 % Final   01/17/2025 12:25 PM 13.2 11.5 - 14.5 % Final   09/12/2024 07:43 AM 13.1 11.5 - 14.5 % Final     Platelets   Date/Time Value Ref Range Status   07/16/2025 07:33  150 - 450 x10*3/uL Final   01/17/2025 12:25  150 - 450 x10*3/uL Final   09/12/2024 07:43  (H) 150 - 450 x10*3/uL Final     No results found for: \"MPV\"  Neutrophils %   Date/Time Value Ref Range Status   07/16/2025 07:33 AM 55.9 40.0 - 80.0 % Final   01/17/2025 12:25 PM 59.4 40.0 - 80.0 % Final   09/12/2024 07:43 AM 50.7 40.0 - 80.0 % Final     Immature Granulocytes %, Automated   Date/Time Value Ref Range Status   07/16/2025 07:33 AM 0.2 0.0 - 0.9 % Final     " Comment:     Immature Granulocyte Count (IG) includes promyelocytes, myelocytes and metamyelocytes but does not include bands. Percent differential counts (%) should be interpreted in the context of the absolute cell counts (cells/UL).   01/17/2025 12:25 PM 0.1 0.0 - 0.9 % Final     Comment:     Immature Granulocyte Count (IG) includes promyelocytes, myelocytes and metamyelocytes but does not include bands. Percent differential counts (%) should be interpreted in the context of the absolute cell counts (cells/UL).   09/12/2024 07:43 AM 0.2 0.0 - 0.9 % Final     Comment:     Immature Granulocyte Count (IG) includes promyelocytes, myelocytes and metamyelocytes but does not include bands. Percent differential counts (%) should be interpreted in the context of the absolute cell counts (cells/UL).     Lymphocytes %   Date/Time Value Ref Range Status   07/16/2025 07:33 AM 30.9 13.0 - 44.0 % Final   01/17/2025 12:25 PM 28.6 13.0 - 44.0 % Final   09/12/2024 07:43 AM 35.1 13.0 - 44.0 % Final     Monocytes %   Date/Time Value Ref Range Status   07/16/2025 07:33 AM 7.6 2.0 - 10.0 % Final   01/17/2025 12:25 PM 7.9 2.0 - 10.0 % Final   09/12/2024 07:43 AM 9.5 2.0 - 10.0 % Final     Eosinophils %   Date/Time Value Ref Range Status   07/16/2025 07:33 AM 4.3 0.0 - 6.0 % Final   01/17/2025 12:25 PM 3.0 0.0 - 6.0 % Final   09/12/2024 07:43 AM 3.2 0.0 - 6.0 % Final     Basophils %   Date/Time Value Ref Range Status   07/16/2025 07:33 AM 1.1 0.0 - 2.0 % Final   01/17/2025 12:25 PM 1.0 0.0 - 2.0 % Final   09/12/2024 07:43 AM 1.3 0.0 - 2.0 % Final     Neutrophils Absolute   Date/Time Value Ref Range Status   07/16/2025 07:33 AM 3.48 1.20 - 7.70 x10*3/uL Final     Comment:     Percent differential counts (%) should be interpreted in the context of the absolute cell counts (cells/uL).   01/17/2025 12:25 PM 4.55 1.20 - 7.70 x10*3/uL Final     Comment:     Percent differential counts (%) should be interpreted in the context of the absolute  "cell counts (cells/uL).   09/12/2024 07:43 AM 2.83 1.20 - 7.70 x10*3/uL Final     Comment:     Percent differential counts (%) should be interpreted in the context of the absolute cell counts (cells/uL).     Immature Granulocytes Absolute, Automated   Date/Time Value Ref Range Status   07/16/2025 07:33 AM 0.01 0.00 - 0.70 x10*3/uL Final   01/17/2025 12:25 PM 0.01 0.00 - 0.70 x10*3/uL Final   09/12/2024 07:43 AM 0.01 0.00 - 0.70 x10*3/uL Final     Lymphocytes Absolute   Date/Time Value Ref Range Status   07/16/2025 07:33 AM 1.92 1.20 - 4.80 x10*3/uL Final   01/17/2025 12:25 PM 2.20 1.20 - 4.80 x10*3/uL Final   09/12/2024 07:43 AM 1.96 1.20 - 4.80 x10*3/uL Final     Monocytes Absolute   Date/Time Value Ref Range Status   07/16/2025 07:33 AM 0.47 0.10 - 1.00 x10*3/uL Final   01/17/2025 12:25 PM 0.61 0.10 - 1.00 x10*3/uL Final   09/12/2024 07:43 AM 0.53 0.10 - 1.00 x10*3/uL Final     Eosinophils Absolute   Date/Time Value Ref Range Status   07/16/2025 07:33 AM 0.27 0.00 - 0.70 x10*3/uL Final   01/17/2025 12:25 PM 0.23 0.00 - 0.70 x10*3/uL Final   09/12/2024 07:43 AM 0.18 0.00 - 0.70 x10*3/uL Final     Basophils Absolute   Date/Time Value Ref Range Status   07/16/2025 07:33 AM 0.07 0.00 - 0.10 x10*3/uL Final   01/17/2025 12:25 PM 0.08 0.00 - 0.10 x10*3/uL Final   09/12/2024 07:43 AM 0.07 0.00 - 0.10 x10*3/uL Final       No components found for: \"PT\"  No results found for: \"APTT\"  Medication Documentation Review Audit       Reviewed by Fredy Dumont DO (Physician) on 03/14/25 at 1324      Medication Order Taking? Sig Documenting Provider Last Dose Status   amLODIPine (Norvasc) 10 mg tablet [id number] Yes Take 1 tablet (10 mg) by mouth once daily. For blood pressure Fredy Dumont DO  Active   hydroCHLOROthiazide (Microzide) 12.5 mg capsule [id number] Yes Take 1 capsule (12.5 mg) by mouth once daily in the morning. Fredy Dumont DO  Active                   Assessment/Plan      1) anemia  -I reviewed her lab history in the " EMR  -4/29/2021 wbc 5.8, hgb 13.2  -8/23/2022 wbc 6.1, hgb 12  -8/9/2023 wbc 6.1, hgb 11.6, MCV 84  -9/12/2024 wbc 5.6, hgb 11.9, MCV 83  -10/10/2024 TIBC 412, sat 12%, ferritin 19, retic 1.3%, haptoglobin 154  -she is recently noted to be iron deficient, however, she says she has known that she is iron deficient (secondary to heavy periods) for many years  -despite that she says she has never been advised to take PO iron supplements before  -advised Susan to start OTC PO iron supplementation  -will recheck CBC/iron indices in 3 months  -here for interval followup via telephone  -has been taking PO iron daily  -she describes her periods as irregular  -labs done on 1/17/2025 included CBC + iron panel + ferritin  -results reviewed--wbc 7.7, hgb 13.2, plt 449,000, TIBC 439, sat 20%, ferritin 30  -as iron is relatively insoluble, and body cannot excrete and excess, advised Ori to back off on iron supplementation to just 3 x a week  -will recheck in 6 months  -here for interval followup via virtual/telehealth modality  -has been taking PO iron supplementation 3 x  a week  -labs done on 7/16/2025 included CBC + iron panel + ferritin  -results reviewed--wbc 6.2, hgb 13.5, plt 381,000, ferritin 36, TIBC 405, sat 30%  -hgb and iron indices holding up well  -advised Ori to continue with PO iron supplementation 3 x a week  -will see her again in 6 months     2) thrombocytosis  -4/29/2021 plt 367,000  -8/23/2022 plt 389,000  -8/9/2023 plt 473,000  -9/12/2024 plt 488,000  -most likely this is reactive thrombocytosis secondary to iron deficiency  -platelet count should normalize once iron deficit corrected     3) hypertension  -on amlodipine  -on hydrochlorothiazide        Problem List Items Addressed This Visit           ICD-10-CM    Anemia D64.9            Maynor Pérez MD                                [1]   Family History  Problem Relation Name Age of Onset    Brain cancer Mother      Lung cancer Father      Thyroid  cancer Sister      Breast cancer Sister  52

## 2025-07-27 ASSESSMENT — ENCOUNTER SYMPTOMS
NEUROLOGICAL NEGATIVE: 1
ENDOCRINE NEGATIVE: 1
CARDIOVASCULAR NEGATIVE: 1
CONSTITUTIONAL NEGATIVE: 1
GASTROINTESTINAL NEGATIVE: 1
EYES NEGATIVE: 1
RESPIRATORY NEGATIVE: 1
PSYCHIATRIC NEGATIVE: 1
MUSCULOSKELETAL NEGATIVE: 1
HEMATOLOGIC/LYMPHATIC NEGATIVE: 1

## 2025-09-19 ENCOUNTER — APPOINTMENT (OUTPATIENT)
Dept: PRIMARY CARE | Facility: CLINIC | Age: 45
End: 2025-09-19
Payer: COMMERCIAL

## 2025-11-12 ENCOUNTER — APPOINTMENT (OUTPATIENT)
Dept: PRIMARY CARE | Facility: CLINIC | Age: 45
End: 2025-11-12
Payer: COMMERCIAL